# Patient Record
Sex: FEMALE | Race: ASIAN | NOT HISPANIC OR LATINO | URBAN - METROPOLITAN AREA
[De-identification: names, ages, dates, MRNs, and addresses within clinical notes are randomized per-mention and may not be internally consistent; named-entity substitution may affect disease eponyms.]

---

## 2018-01-01 ENCOUNTER — INPATIENT (INPATIENT)
Facility: HOSPITAL | Age: 58
LOS: 10 days | Discharge: HOME | End: 2018-08-06
Attending: LEGAL MEDICINE | Admitting: LEGAL MEDICINE
Payer: MEDICAID

## 2018-01-01 ENCOUNTER — INPATIENT (INPATIENT)
Facility: HOSPITAL | Age: 58
LOS: 2 days | End: 2018-09-16
Attending: LEGAL MEDICINE | Admitting: LEGAL MEDICINE
Payer: MEDICAID

## 2018-01-01 VITALS
RESPIRATION RATE: 16 BRPM | SYSTOLIC BLOOD PRESSURE: 125 MMHG | TEMPERATURE: 98 F | OXYGEN SATURATION: 99 % | DIASTOLIC BLOOD PRESSURE: 8 MMHG | HEART RATE: 81 BPM

## 2018-01-01 VITALS
TEMPERATURE: 99 F | RESPIRATION RATE: 14 BRPM | SYSTOLIC BLOOD PRESSURE: 153 MMHG | HEART RATE: 96 BPM | DIASTOLIC BLOOD PRESSURE: 100 MMHG | OXYGEN SATURATION: 100 %

## 2018-01-01 VITALS
RESPIRATION RATE: 14 BRPM | HEART RATE: 110 BPM | TEMPERATURE: 98 F | DIASTOLIC BLOOD PRESSURE: 84 MMHG | OXYGEN SATURATION: 98 % | SYSTOLIC BLOOD PRESSURE: 136 MMHG

## 2018-01-01 VITALS — DIASTOLIC BLOOD PRESSURE: 55 MMHG | SYSTOLIC BLOOD PRESSURE: 95 MMHG

## 2018-01-01 DIAGNOSIS — D64.9 ANEMIA, UNSPECIFIED: ICD-10-CM

## 2018-01-01 DIAGNOSIS — E87.1 HYPO-OSMOLALITY AND HYPONATREMIA: ICD-10-CM

## 2018-01-01 DIAGNOSIS — R16.0 HEPATOMEGALY, NOT ELSEWHERE CLASSIFIED: ICD-10-CM

## 2018-01-01 DIAGNOSIS — R10.9 UNSPECIFIED ABDOMINAL PAIN: ICD-10-CM

## 2018-01-01 DIAGNOSIS — E87.5 HYPERKALEMIA: ICD-10-CM

## 2018-01-01 DIAGNOSIS — Z29.9 ENCOUNTER FOR PROPHYLACTIC MEASURES, UNSPECIFIED: ICD-10-CM

## 2018-01-01 DIAGNOSIS — C79.9 SECONDARY MALIGNANT NEOPLASM OF UNSPECIFIED SITE: ICD-10-CM

## 2018-01-01 DIAGNOSIS — G93.40 ENCEPHALOPATHY, UNSPECIFIED: ICD-10-CM

## 2018-01-01 DIAGNOSIS — R06.00 DYSPNEA, UNSPECIFIED: ICD-10-CM

## 2018-01-01 DIAGNOSIS — Z51.5 ENCOUNTER FOR PALLIATIVE CARE: ICD-10-CM

## 2018-01-01 DIAGNOSIS — R74.0 NONSPECIFIC ELEVATION OF LEVELS OF TRANSAMINASE AND LACTIC ACID DEHYDROGENASE [LDH]: ICD-10-CM

## 2018-01-01 DIAGNOSIS — E87.2 ACIDOSIS: ICD-10-CM

## 2018-01-01 DIAGNOSIS — Z71.89 OTHER SPECIFIED COUNSELING: ICD-10-CM

## 2018-01-01 DIAGNOSIS — D72.829 ELEVATED WHITE BLOOD CELL COUNT, UNSPECIFIED: ICD-10-CM

## 2018-01-01 LAB
AFP-TM SERPL-MCNC: 3281 NG/ML — HIGH
ALBUMIN SERPL ELPH-MCNC: 2.2 G/DL — LOW (ref 3.3–5)
ALBUMIN SERPL ELPH-MCNC: 2.2 G/DL — LOW (ref 3.3–5)
ALBUMIN SERPL ELPH-MCNC: 2.3 G/DL — LOW (ref 3.3–5)
ALBUMIN SERPL ELPH-MCNC: 2.4 G/DL — LOW (ref 3.3–5)
ALBUMIN SERPL ELPH-MCNC: 3.5 G/DL — SIGNIFICANT CHANGE UP (ref 3.3–5)
ALBUMIN SERPL ELPH-MCNC: 3.6 G/DL — SIGNIFICANT CHANGE UP (ref 3.3–5)
ALBUMIN SERPL ELPH-MCNC: 3.6 G/DL — SIGNIFICANT CHANGE UP (ref 3.3–5)
ALBUMIN SERPL ELPH-MCNC: 3.7 G/DL — SIGNIFICANT CHANGE UP (ref 3.3–5)
ALBUMIN SERPL ELPH-MCNC: 3.8 G/DL — SIGNIFICANT CHANGE UP (ref 3.3–5)
ALBUMIN SERPL ELPH-MCNC: 3.9 G/DL — SIGNIFICANT CHANGE UP (ref 3.3–5)
ALBUMIN SERPL ELPH-MCNC: 4.2 G/DL — SIGNIFICANT CHANGE UP (ref 3.3–5)
ALP SERPL-CCNC: 179 U/L — HIGH (ref 40–120)
ALP SERPL-CCNC: 187 U/L — HIGH (ref 40–120)
ALP SERPL-CCNC: 198 U/L — HIGH (ref 40–120)
ALP SERPL-CCNC: 207 U/L — HIGH (ref 40–120)
ALP SERPL-CCNC: 218 U/L — HIGH (ref 40–120)
ALP SERPL-CCNC: 224 U/L — HIGH (ref 40–120)
ALP SERPL-CCNC: 275 U/L — HIGH (ref 40–120)
ALP SERPL-CCNC: 297 U/L — HIGH (ref 40–120)
ALP SERPL-CCNC: 307 U/L — HIGH (ref 40–120)
ALP SERPL-CCNC: 307 U/L — HIGH (ref 40–120)
ALP SERPL-CCNC: 694 U/L — HIGH (ref 40–120)
ALP SERPL-CCNC: 719 U/L — HIGH (ref 40–120)
ALP SERPL-CCNC: 724 U/L — HIGH (ref 40–120)
ALP SERPL-CCNC: 744 U/L — HIGH (ref 40–120)
ALT FLD-CCNC: 100 U/L — HIGH (ref 4–33)
ALT FLD-CCNC: 100 U/L — HIGH (ref 4–33)
ALT FLD-CCNC: 145 U/L — HIGH (ref 4–33)
ALT FLD-CCNC: 35 U/L — HIGH (ref 4–33)
ALT FLD-CCNC: 38 U/L — HIGH (ref 4–33)
ALT FLD-CCNC: 39 U/L — HIGH (ref 4–33)
ALT FLD-CCNC: 41 U/L — HIGH (ref 4–33)
ALT FLD-CCNC: 49 U/L — HIGH (ref 4–33)
ALT FLD-CCNC: 50 U/L — HIGH (ref 4–33)
ALT FLD-CCNC: 58 U/L — HIGH (ref 4–33)
ALT FLD-CCNC: 63 U/L — HIGH (ref 4–33)
ALT FLD-CCNC: 64 U/L — HIGH (ref 4–33)
ALT FLD-CCNC: 64 U/L — HIGH (ref 4–33)
ALT FLD-CCNC: 97 U/L — HIGH (ref 4–33)
AMMONIA BLD-MCNC: 71 UMOL/L — HIGH (ref 11–55)
AMMONIA BLD-MCNC: 81 UMOL/L — HIGH (ref 11–55)
AMMONIA BLD-MCNC: 88 UMOL/L — HIGH (ref 11–55)
AMMONIA BLD-MCNC: 93 UMOL/L — HIGH (ref 11–55)
ANISOCYTOSIS BLD QL: SIGNIFICANT CHANGE UP
ANISOCYTOSIS BLD QL: SLIGHT — SIGNIFICANT CHANGE UP
APTT BLD: 29.2 SEC — SIGNIFICANT CHANGE UP (ref 27.5–37.4)
APTT BLD: 29.9 SEC — SIGNIFICANT CHANGE UP (ref 27.5–37.4)
APTT BLD: 40.8 SEC — HIGH (ref 27.5–37.4)
AST SERPL-CCNC: 107 U/L — HIGH (ref 4–32)
AST SERPL-CCNC: 114 U/L — HIGH (ref 4–32)
AST SERPL-CCNC: 121 U/L — HIGH (ref 4–32)
AST SERPL-CCNC: 121 U/L — HIGH (ref 4–32)
AST SERPL-CCNC: 413 U/L — HIGH (ref 4–32)
AST SERPL-CCNC: 418 U/L — HIGH (ref 4–32)
AST SERPL-CCNC: 443 U/L — HIGH (ref 4–32)
AST SERPL-CCNC: 73 U/L — HIGH (ref 4–32)
AST SERPL-CCNC: 75 U/L — HIGH (ref 4–32)
AST SERPL-CCNC: 78 U/L — HIGH (ref 4–32)
AST SERPL-CCNC: 791 U/L — HIGH (ref 4–32)
AST SERPL-CCNC: 83 U/L — HIGH (ref 4–32)
AST SERPL-CCNC: 86 U/L — HIGH (ref 4–32)
AST SERPL-CCNC: 92 U/L — HIGH (ref 4–32)
BASE EXCESS BLDV CALC-SCNC: 1.4 MMOL/L — SIGNIFICANT CHANGE UP
BASO STIPL BLD QL SMEAR: PRESENT — SIGNIFICANT CHANGE UP
BASO STIPL BLD QL SMEAR: SLIGHT — SIGNIFICANT CHANGE UP
BASOPHILS # BLD AUTO: 0.02 K/UL — SIGNIFICANT CHANGE UP (ref 0–0.2)
BASOPHILS # BLD AUTO: 0.05 K/UL — SIGNIFICANT CHANGE UP (ref 0–0.2)
BASOPHILS NFR BLD AUTO: 0.3 % — SIGNIFICANT CHANGE UP (ref 0–2)
BASOPHILS NFR BLD AUTO: 0.3 % — SIGNIFICANT CHANGE UP (ref 0–2)
BASOPHILS NFR SPEC: 0 % — SIGNIFICANT CHANGE UP (ref 0–2)
BASOPHILS NFR SPEC: 1 % — SIGNIFICANT CHANGE UP (ref 0–2)
BILIRUB DIRECT SERPL-MCNC: 0.3 MG/DL — HIGH (ref 0.1–0.2)
BILIRUB DIRECT SERPL-MCNC: 0.3 MG/DL — HIGH (ref 0.1–0.2)
BILIRUB DIRECT SERPL-MCNC: 0.4 MG/DL — HIGH (ref 0.1–0.2)
BILIRUB SERPL-MCNC: 0.8 MG/DL — SIGNIFICANT CHANGE UP (ref 0.2–1.2)
BILIRUB SERPL-MCNC: 0.9 MG/DL — SIGNIFICANT CHANGE UP (ref 0.2–1.2)
BILIRUB SERPL-MCNC: 1 MG/DL — SIGNIFICANT CHANGE UP (ref 0.2–1.2)
BILIRUB SERPL-MCNC: 1.1 MG/DL — SIGNIFICANT CHANGE UP (ref 0.2–1.2)
BILIRUB SERPL-MCNC: 1.2 MG/DL — SIGNIFICANT CHANGE UP (ref 0.2–1.2)
BILIRUB SERPL-MCNC: 1.2 MG/DL — SIGNIFICANT CHANGE UP (ref 0.2–1.2)
BILIRUB SERPL-MCNC: 1.3 MG/DL — HIGH (ref 0.2–1.2)
BILIRUB SERPL-MCNC: 31.3 MG/DL — HIGH (ref 0.2–1.2)
BILIRUB SERPL-MCNC: 31.7 MG/DL — HIGH (ref 0.2–1.2)
BILIRUB SERPL-MCNC: 32 MG/DL — HIGH (ref 0.2–1.2)
BILIRUB SERPL-MCNC: 32.5 MG/DL — HIGH (ref 0.2–1.2)
BLASTS # FLD: 0 % — SIGNIFICANT CHANGE UP (ref 0–0)
BLOOD GAS VENOUS - CREATININE: 0.67 MG/DL — SIGNIFICANT CHANGE UP (ref 0.5–1.3)
BUN SERPL-MCNC: 11 MG/DL — SIGNIFICANT CHANGE UP (ref 7–23)
BUN SERPL-MCNC: 12 MG/DL — SIGNIFICANT CHANGE UP (ref 7–23)
BUN SERPL-MCNC: 12 MG/DL — SIGNIFICANT CHANGE UP (ref 7–23)
BUN SERPL-MCNC: 13 MG/DL — SIGNIFICANT CHANGE UP (ref 7–23)
BUN SERPL-MCNC: 14 MG/DL — SIGNIFICANT CHANGE UP (ref 7–23)
BUN SERPL-MCNC: 14 MG/DL — SIGNIFICANT CHANGE UP (ref 7–23)
BUN SERPL-MCNC: 44 MG/DL — HIGH (ref 7–23)
BUN SERPL-MCNC: 45 MG/DL — HIGH (ref 7–23)
BUN SERPL-MCNC: 50 MG/DL — HIGH (ref 7–23)
BUN SERPL-MCNC: 62 MG/DL — HIGH (ref 7–23)
CALCIUM SERPL-MCNC: 8.7 MG/DL — SIGNIFICANT CHANGE UP (ref 8.4–10.5)
CALCIUM SERPL-MCNC: 9.1 MG/DL — SIGNIFICANT CHANGE UP (ref 8.4–10.5)
CALCIUM SERPL-MCNC: 9.2 MG/DL — SIGNIFICANT CHANGE UP (ref 8.4–10.5)
CALCIUM SERPL-MCNC: 9.3 MG/DL — SIGNIFICANT CHANGE UP (ref 8.4–10.5)
CALCIUM SERPL-MCNC: 9.4 MG/DL — SIGNIFICANT CHANGE UP (ref 8.4–10.5)
CALCIUM SERPL-MCNC: 9.4 MG/DL — SIGNIFICANT CHANGE UP (ref 8.4–10.5)
CALCIUM SERPL-MCNC: 9.6 MG/DL — SIGNIFICANT CHANGE UP (ref 8.4–10.5)
CALCIUM SERPL-MCNC: 9.7 MG/DL — SIGNIFICANT CHANGE UP (ref 8.4–10.5)
CANCER AG19-9 SERPL-ACNC: 74 U/ML — HIGH
CEA SERPL-MCNC: 5.8 NG/ML — HIGH (ref 1–3.8)
CHLORIDE BLDV-SCNC: 104 MMOL/L — SIGNIFICANT CHANGE UP (ref 96–108)
CHLORIDE SERPL-SCNC: 101 MMOL/L — SIGNIFICANT CHANGE UP (ref 98–107)
CHLORIDE SERPL-SCNC: 104 MMOL/L — SIGNIFICANT CHANGE UP (ref 98–107)
CHLORIDE SERPL-SCNC: 82 MMOL/L — LOW (ref 98–107)
CHLORIDE SERPL-SCNC: 84 MMOL/L — LOW (ref 98–107)
CHLORIDE SERPL-SCNC: 84 MMOL/L — LOW (ref 98–107)
CHLORIDE SERPL-SCNC: 87 MMOL/L — LOW (ref 98–107)
CHLORIDE SERPL-SCNC: 98 MMOL/L — SIGNIFICANT CHANGE UP (ref 98–107)
CHLORIDE SERPL-SCNC: 98 MMOL/L — SIGNIFICANT CHANGE UP (ref 98–107)
CO2 SERPL-SCNC: 10 MMOL/L — CRITICAL LOW (ref 22–31)
CO2 SERPL-SCNC: 11 MMOL/L — LOW (ref 22–31)
CO2 SERPL-SCNC: 12 MMOL/L — LOW (ref 22–31)
CO2 SERPL-SCNC: 24 MMOL/L — SIGNIFICANT CHANGE UP (ref 22–31)
CO2 SERPL-SCNC: 25 MMOL/L — SIGNIFICANT CHANGE UP (ref 22–31)
CO2 SERPL-SCNC: 26 MMOL/L — SIGNIFICANT CHANGE UP (ref 22–31)
CO2 SERPL-SCNC: 26 MMOL/L — SIGNIFICANT CHANGE UP (ref 22–31)
CO2 SERPL-SCNC: 8 MMOL/L — CRITICAL LOW (ref 22–31)
CREAT SERPL-MCNC: 0.63 MG/DL — SIGNIFICANT CHANGE UP (ref 0.5–1.3)
CREAT SERPL-MCNC: 0.65 MG/DL — SIGNIFICANT CHANGE UP (ref 0.5–1.3)
CREAT SERPL-MCNC: 0.67 MG/DL — SIGNIFICANT CHANGE UP (ref 0.5–1.3)
CREAT SERPL-MCNC: 0.72 MG/DL — SIGNIFICANT CHANGE UP (ref 0.5–1.3)
CREAT SERPL-MCNC: 0.73 MG/DL — SIGNIFICANT CHANGE UP (ref 0.5–1.3)
CREAT SERPL-MCNC: 0.74 MG/DL — SIGNIFICANT CHANGE UP (ref 0.5–1.3)
CREAT SERPL-MCNC: 0.78 MG/DL — SIGNIFICANT CHANGE UP (ref 0.5–1.3)
CREAT SERPL-MCNC: 0.8 MG/DL — SIGNIFICANT CHANGE UP (ref 0.5–1.3)
CREAT SERPL-MCNC: 0.8 MG/DL — SIGNIFICANT CHANGE UP (ref 0.5–1.3)
CREAT SERPL-MCNC: 1.06 MG/DL — SIGNIFICANT CHANGE UP (ref 0.5–1.3)
CREAT SERPL-MCNC: 1.07 MG/DL — SIGNIFICANT CHANGE UP (ref 0.5–1.3)
CREAT SERPL-MCNC: 1.21 MG/DL — SIGNIFICANT CHANGE UP (ref 0.5–1.3)
CREAT SERPL-MCNC: 1.77 MG/DL — HIGH (ref 0.5–1.3)
DACRYOCYTES BLD QL SMEAR: SIGNIFICANT CHANGE UP
DACRYOCYTES BLD QL SMEAR: SLIGHT — SIGNIFICANT CHANGE UP
EOSINOPHIL # BLD AUTO: 0.03 K/UL — SIGNIFICANT CHANGE UP (ref 0–0.5)
EOSINOPHIL # BLD AUTO: 0.11 K/UL — SIGNIFICANT CHANGE UP (ref 0–0.5)
EOSINOPHIL NFR BLD AUTO: 0.2 % — SIGNIFICANT CHANGE UP (ref 0–6)
EOSINOPHIL NFR BLD AUTO: 1.5 % — SIGNIFICANT CHANGE UP (ref 0–6)
EOSINOPHIL NFR FLD: 1.8 % — SIGNIFICANT CHANGE UP (ref 0–6)
FERRITIN SERPL-MCNC: 531.4 NG/ML — HIGH (ref 15–150)
FOLATE SERPL-MCNC: 14 NG/ML — SIGNIFICANT CHANGE UP (ref 4.7–20)
GAS PNL BLDV: 137 MMOL/L — SIGNIFICANT CHANGE UP (ref 136–146)
GIANT PLATELETS BLD QL SMEAR: PRESENT — SIGNIFICANT CHANGE UP
GLUCOSE BLDC GLUCOMTR-MCNC: 103 MG/DL — HIGH (ref 70–99)
GLUCOSE BLDC GLUCOMTR-MCNC: 107 MG/DL — HIGH (ref 70–99)
GLUCOSE BLDC GLUCOMTR-MCNC: 110 MG/DL — HIGH (ref 70–99)
GLUCOSE BLDC GLUCOMTR-MCNC: 116 MG/DL — HIGH (ref 70–99)
GLUCOSE BLDC GLUCOMTR-MCNC: 116 MG/DL — HIGH (ref 70–99)
GLUCOSE BLDC GLUCOMTR-MCNC: 121 MG/DL — HIGH (ref 70–99)
GLUCOSE BLDC GLUCOMTR-MCNC: 83 MG/DL — SIGNIFICANT CHANGE UP (ref 70–99)
GLUCOSE BLDV-MCNC: 94 — SIGNIFICANT CHANGE UP (ref 70–99)
GLUCOSE SERPL-MCNC: 69 MG/DL — LOW (ref 70–99)
GLUCOSE SERPL-MCNC: 74 MG/DL — SIGNIFICANT CHANGE UP (ref 70–99)
GLUCOSE SERPL-MCNC: 75 MG/DL — SIGNIFICANT CHANGE UP (ref 70–99)
GLUCOSE SERPL-MCNC: 76 MG/DL — SIGNIFICANT CHANGE UP (ref 70–99)
GLUCOSE SERPL-MCNC: 78 MG/DL — SIGNIFICANT CHANGE UP (ref 70–99)
GLUCOSE SERPL-MCNC: 80 MG/DL — SIGNIFICANT CHANGE UP (ref 70–99)
GLUCOSE SERPL-MCNC: 81 MG/DL — SIGNIFICANT CHANGE UP (ref 70–99)
GLUCOSE SERPL-MCNC: 82 MG/DL — SIGNIFICANT CHANGE UP (ref 70–99)
GLUCOSE SERPL-MCNC: 83 MG/DL — SIGNIFICANT CHANGE UP (ref 70–99)
GLUCOSE SERPL-MCNC: 85 MG/DL — SIGNIFICANT CHANGE UP (ref 70–99)
GLUCOSE SERPL-MCNC: 90 MG/DL — SIGNIFICANT CHANGE UP (ref 70–99)
HAV IGM SER-ACNC: NONREACTIVE — SIGNIFICANT CHANGE UP
HBA1C BLD-MCNC: 4.9 % — SIGNIFICANT CHANGE UP (ref 4–5.6)
HBV CORE IGM SER-ACNC: NONREACTIVE — SIGNIFICANT CHANGE UP
HBV SURFACE AG SER-ACNC: NONREACTIVE — SIGNIFICANT CHANGE UP
HCO3 BLDV-SCNC: 24 MMOL/L — SIGNIFICANT CHANGE UP (ref 20–27)
HCT VFR BLD CALC: 27 % — LOW (ref 34.5–45)
HCT VFR BLD CALC: 27 % — LOW (ref 34.5–45)
HCT VFR BLD CALC: 27.1 % — LOW (ref 34.5–45)
HCT VFR BLD CALC: 27.4 % — LOW (ref 34.5–45)
HCT VFR BLD CALC: 33.5 % — LOW (ref 34.5–45)
HCT VFR BLD CALC: 33.8 % — LOW (ref 34.5–45)
HCT VFR BLD CALC: 33.9 % — LOW (ref 34.5–45)
HCT VFR BLD CALC: 34.7 % — SIGNIFICANT CHANGE UP (ref 34.5–45)
HCT VFR BLD CALC: 34.8 % — SIGNIFICANT CHANGE UP (ref 34.5–45)
HCT VFR BLD CALC: 34.8 % — SIGNIFICANT CHANGE UP (ref 34.5–45)
HCT VFR BLD CALC: 35.3 % — SIGNIFICANT CHANGE UP (ref 34.5–45)
HCT VFR BLD CALC: 36.7 % — SIGNIFICANT CHANGE UP (ref 34.5–45)
HCT VFR BLDV CALC: 33.2 % — LOW (ref 34.5–45)
HCV AB S/CO SERPL IA: 0.41 S/CO — SIGNIFICANT CHANGE UP
HCV AB SERPL-IMP: SIGNIFICANT CHANGE UP
HGB BLD-MCNC: 10.1 G/DL — LOW (ref 11.5–15.5)
HGB BLD-MCNC: 10.1 G/DL — LOW (ref 11.5–15.5)
HGB BLD-MCNC: 10.2 G/DL — LOW (ref 11.5–15.5)
HGB BLD-MCNC: 10.4 G/DL — LOW (ref 11.5–15.5)
HGB BLD-MCNC: 10.4 G/DL — LOW (ref 11.5–15.5)
HGB BLD-MCNC: 10.7 G/DL — LOW (ref 11.5–15.5)
HGB BLD-MCNC: 8.4 G/DL — LOW (ref 11.5–15.5)
HGB BLD-MCNC: 8.6 G/DL — LOW (ref 11.5–15.5)
HGB BLD-MCNC: 8.7 G/DL — LOW (ref 11.5–15.5)
HGB BLD-MCNC: 8.9 G/DL — LOW (ref 11.5–15.5)
HGB BLD-MCNC: 9.7 G/DL — LOW (ref 11.5–15.5)
HGB BLD-MCNC: 9.9 G/DL — LOW (ref 11.5–15.5)
HGB BLDV-MCNC: 10.8 G/DL — LOW (ref 11.5–15.5)
HIV 1+2 AB+HIV1 P24 AG SERPL QL IA: SIGNIFICANT CHANGE UP
HYPOCHROMIA BLD QL: SIGNIFICANT CHANGE UP
HYPOCHROMIA BLD QL: SLIGHT — SIGNIFICANT CHANGE UP
IMM GRANULOCYTES # BLD AUTO: 0.05 # — SIGNIFICANT CHANGE UP
IMM GRANULOCYTES # BLD AUTO: 1.45 # — SIGNIFICANT CHANGE UP
IMM GRANULOCYTES NFR BLD AUTO: 0.7 % — SIGNIFICANT CHANGE UP (ref 0–1.5)
IMM GRANULOCYTES NFR BLD AUTO: 8 % — HIGH (ref 0–1.5)
INR BLD: 1.04 — SIGNIFICANT CHANGE UP (ref 0.88–1.17)
INR BLD: 1.06 — SIGNIFICANT CHANGE UP (ref 0.88–1.17)
INR BLD: 1.85 — HIGH (ref 0.88–1.17)
IRON SATN MFR SERPL: 181 UG/DL — SIGNIFICANT CHANGE UP (ref 140–530)
IRON SATN MFR SERPL: 39 UG/DL — SIGNIFICANT CHANGE UP (ref 30–160)
LACTATE BLDV-MCNC: 1.3 MMOL/L — SIGNIFICANT CHANGE UP (ref 0.5–2)
LG PLATELETS BLD QL AUTO: SLIGHT — SIGNIFICANT CHANGE UP
LIDOCAIN IGE QN: 23.9 U/L — SIGNIFICANT CHANGE UP (ref 7–60)
LYMPHOCYTES # BLD AUTO: 1.48 K/UL — SIGNIFICANT CHANGE UP (ref 1–3.3)
LYMPHOCYTES # BLD AUTO: 11.4 % — LOW (ref 13–44)
LYMPHOCYTES # BLD AUTO: 2.08 K/UL — SIGNIFICANT CHANGE UP (ref 1–3.3)
LYMPHOCYTES # BLD AUTO: 20.3 % — SIGNIFICANT CHANGE UP (ref 13–44)
LYMPHOCYTES NFR SPEC AUTO: 19.3 % — SIGNIFICANT CHANGE UP (ref 13–44)
LYMPHOCYTES NFR SPEC AUTO: 5 % — LOW (ref 13–44)
MACROCYTES BLD QL: SLIGHT — SIGNIFICANT CHANGE UP
MAGNESIUM SERPL-MCNC: 2.3 MG/DL — SIGNIFICANT CHANGE UP (ref 1.6–2.6)
MAGNESIUM SERPL-MCNC: 2.3 MG/DL — SIGNIFICANT CHANGE UP (ref 1.6–2.6)
MAGNESIUM SERPL-MCNC: 2.5 MG/DL — SIGNIFICANT CHANGE UP (ref 1.6–2.6)
MAGNESIUM SERPL-MCNC: 2.9 MG/DL — HIGH (ref 1.6–2.6)
MANUAL SMEAR VERIFICATION: SIGNIFICANT CHANGE UP
MANUAL SMEAR VERIFICATION: YES — SIGNIFICANT CHANGE UP
MCHC RBC-ENTMCNC: 18.1 PG — LOW (ref 27–34)
MCHC RBC-ENTMCNC: 18.1 PG — LOW (ref 27–34)
MCHC RBC-ENTMCNC: 18.2 PG — LOW (ref 27–34)
MCHC RBC-ENTMCNC: 18.3 PG — LOW (ref 27–34)
MCHC RBC-ENTMCNC: 18.4 PG — LOW (ref 27–34)
MCHC RBC-ENTMCNC: 18.4 PG — LOW (ref 27–34)
MCHC RBC-ENTMCNC: 18.5 PG — LOW (ref 27–34)
MCHC RBC-ENTMCNC: 18.8 PG — LOW (ref 27–34)
MCHC RBC-ENTMCNC: 19 PG — LOW (ref 27–34)
MCHC RBC-ENTMCNC: 28.6 % — LOW (ref 32–36)
MCHC RBC-ENTMCNC: 29 % — LOW (ref 32–36)
MCHC RBC-ENTMCNC: 29.2 % — LOW (ref 32–36)
MCHC RBC-ENTMCNC: 29.3 % — LOW (ref 32–36)
MCHC RBC-ENTMCNC: 29.4 % — LOW (ref 32–36)
MCHC RBC-ENTMCNC: 29.5 % — LOW (ref 32–36)
MCHC RBC-ENTMCNC: 29.9 % — LOW (ref 32–36)
MCHC RBC-ENTMCNC: 30.1 % — LOW (ref 32–36)
MCHC RBC-ENTMCNC: 31 % — LOW (ref 32–36)
MCHC RBC-ENTMCNC: 31.9 % — LOW (ref 32–36)
MCHC RBC-ENTMCNC: 32.2 % — SIGNIFICANT CHANGE UP (ref 32–36)
MCHC RBC-ENTMCNC: 32.5 % — SIGNIFICANT CHANGE UP (ref 32–36)
MCV RBC AUTO: 56.5 FL — LOW (ref 80–100)
MCV RBC AUTO: 57.2 FL — LOW (ref 80–100)
MCV RBC AUTO: 57.8 FL — LOW (ref 80–100)
MCV RBC AUTO: 58.8 FL — LOW (ref 80–100)
MCV RBC AUTO: 62 FL — LOW (ref 80–100)
MCV RBC AUTO: 62.1 FL — LOW (ref 80–100)
MCV RBC AUTO: 62.3 FL — LOW (ref 80–100)
MCV RBC AUTO: 62.5 FL — LOW (ref 80–100)
MCV RBC AUTO: 62.7 FL — LOW (ref 80–100)
MCV RBC AUTO: 62.9 FL — LOW (ref 80–100)
MCV RBC AUTO: 63.1 FL — LOW (ref 80–100)
MCV RBC AUTO: 63.4 FL — LOW (ref 80–100)
METAMYELOCYTES # FLD: 0 % — SIGNIFICANT CHANGE UP (ref 0–1)
MICROCYTES BLD QL: SIGNIFICANT CHANGE UP
MICROCYTES BLD QL: SLIGHT — SIGNIFICANT CHANGE UP
MONOCYTES # BLD AUTO: 0.56 K/UL — SIGNIFICANT CHANGE UP (ref 0–0.9)
MONOCYTES # BLD AUTO: 1.1 K/UL — HIGH (ref 0–0.9)
MONOCYTES NFR BLD AUTO: 6 % — SIGNIFICANT CHANGE UP (ref 2–14)
MONOCYTES NFR BLD AUTO: 7.7 % — SIGNIFICANT CHANGE UP (ref 2–14)
MONOCYTES NFR BLD: 2.6 % — SIGNIFICANT CHANGE UP (ref 2–9)
MONOCYTES NFR BLD: 5 % — SIGNIFICANT CHANGE UP (ref 2–9)
MYELOCYTES NFR BLD: 0 % — SIGNIFICANT CHANGE UP (ref 0–0)
MYELOCYTES NFR BLD: 1 % — HIGH (ref 0–0)
NEUTROPHIL AB SER-ACNC: 72.8 % — SIGNIFICANT CHANGE UP (ref 43–77)
NEUTROPHIL AB SER-ACNC: 88 % — HIGH (ref 43–77)
NEUTROPHILS # BLD AUTO: 13.49 K/UL — HIGH (ref 1.8–7.4)
NEUTROPHILS # BLD AUTO: 5.08 K/UL — SIGNIFICANT CHANGE UP (ref 1.8–7.4)
NEUTROPHILS NFR BLD AUTO: 69.5 % — SIGNIFICANT CHANGE UP (ref 43–77)
NEUTROPHILS NFR BLD AUTO: 74.1 % — SIGNIFICANT CHANGE UP (ref 43–77)
NEUTS BAND # BLD: 0 % — SIGNIFICANT CHANGE UP (ref 0–6)
NON-GYNECOLOGICAL CYTOLOGY STUDY: SIGNIFICANT CHANGE UP
NRBC # FLD: 0 — SIGNIFICANT CHANGE UP
NRBC # FLD: 0.02 — SIGNIFICANT CHANGE UP
NRBC # FLD: 0.02 — SIGNIFICANT CHANGE UP
NRBC # FLD: 4.94 — SIGNIFICANT CHANGE UP
NRBC # FLD: 5.08 — SIGNIFICANT CHANGE UP
NRBC # FLD: 5.18 — SIGNIFICANT CHANGE UP
NRBC # FLD: 9.78 — SIGNIFICANT CHANGE UP
NRBC FLD-RTO: 26.8 — SIGNIFICANT CHANGE UP
NRBC FLD-RTO: 28.5 — SIGNIFICANT CHANGE UP
NRBC FLD-RTO: 29.5 — SIGNIFICANT CHANGE UP
NRBC FLD-RTO: 52.6 — SIGNIFICANT CHANGE UP
OTHER - HEMATOLOGY %: 0 — SIGNIFICANT CHANGE UP
OVALOCYTES BLD QL SMEAR: SIGNIFICANT CHANGE UP
OVALOCYTES BLD QL SMEAR: SIGNIFICANT CHANGE UP
PCO2 BLDV: 49 MMHG — SIGNIFICANT CHANGE UP (ref 41–51)
PH BLDV: 7.35 PH — SIGNIFICANT CHANGE UP (ref 7.32–7.43)
PHOSPHATE SERPL-MCNC: 3.6 MG/DL — SIGNIFICANT CHANGE UP (ref 2.5–4.5)
PHOSPHATE SERPL-MCNC: 3.7 MG/DL — SIGNIFICANT CHANGE UP (ref 2.5–4.5)
PHOSPHATE SERPL-MCNC: 3.8 MG/DL — SIGNIFICANT CHANGE UP (ref 2.5–4.5)
PHOSPHATE SERPL-MCNC: 6.5 MG/DL — HIGH (ref 2.5–4.5)
PLATELET # BLD AUTO: 166 K/UL — SIGNIFICANT CHANGE UP (ref 150–400)
PLATELET # BLD AUTO: 168 K/UL — SIGNIFICANT CHANGE UP (ref 150–400)
PLATELET # BLD AUTO: 188 K/UL — SIGNIFICANT CHANGE UP (ref 150–400)
PLATELET # BLD AUTO: 194 K/UL — SIGNIFICANT CHANGE UP (ref 150–400)
PLATELET # BLD AUTO: 194 K/UL — SIGNIFICANT CHANGE UP (ref 150–400)
PLATELET # BLD AUTO: 200 K/UL — SIGNIFICANT CHANGE UP (ref 150–400)
PLATELET # BLD AUTO: 205 K/UL — SIGNIFICANT CHANGE UP (ref 150–400)
PLATELET # BLD AUTO: 215 K/UL — SIGNIFICANT CHANGE UP (ref 150–400)
PLATELET # BLD AUTO: 265 K/UL — SIGNIFICANT CHANGE UP (ref 150–400)
PLATELET # BLD AUTO: 274 K/UL — SIGNIFICANT CHANGE UP (ref 150–400)
PLATELET # BLD AUTO: 285 K/UL — SIGNIFICANT CHANGE UP (ref 150–400)
PLATELET # BLD AUTO: 285 K/UL — SIGNIFICANT CHANGE UP (ref 150–400)
PLATELET COUNT - ESTIMATE: ADEQUATE — SIGNIFICANT CHANGE UP
PLATELET COUNT - ESTIMATE: NORMAL — SIGNIFICANT CHANGE UP
PMV BLD: SIGNIFICANT CHANGE UP FL (ref 7–13)
PO2 BLDV: 30 MMHG — LOW (ref 35–40)
POIKILOCYTOSIS BLD QL AUTO: SIGNIFICANT CHANGE UP
POIKILOCYTOSIS BLD QL AUTO: SLIGHT — SIGNIFICANT CHANGE UP
POLYCHROMASIA BLD QL SMEAR: SLIGHT — SIGNIFICANT CHANGE UP
POTASSIUM BLDV-SCNC: 4 MMOL/L — SIGNIFICANT CHANGE UP (ref 3.4–4.5)
POTASSIUM SERPL-MCNC: 4 MMOL/L — SIGNIFICANT CHANGE UP (ref 3.5–5.3)
POTASSIUM SERPL-MCNC: 4.1 MMOL/L — SIGNIFICANT CHANGE UP (ref 3.5–5.3)
POTASSIUM SERPL-MCNC: 4.3 MMOL/L — SIGNIFICANT CHANGE UP (ref 3.5–5.3)
POTASSIUM SERPL-MCNC: 4.5 MMOL/L — SIGNIFICANT CHANGE UP (ref 3.5–5.3)
POTASSIUM SERPL-MCNC: 4.7 MMOL/L — SIGNIFICANT CHANGE UP (ref 3.5–5.3)
POTASSIUM SERPL-MCNC: 4.7 MMOL/L — SIGNIFICANT CHANGE UP (ref 3.5–5.3)
POTASSIUM SERPL-MCNC: 5.3 MMOL/L — SIGNIFICANT CHANGE UP (ref 3.5–5.3)
POTASSIUM SERPL-MCNC: 5.6 MMOL/L — HIGH (ref 3.5–5.3)
POTASSIUM SERPL-MCNC: 5.6 MMOL/L — HIGH (ref 3.5–5.3)
POTASSIUM SERPL-MCNC: 6.1 MMOL/L — HIGH (ref 3.5–5.3)
POTASSIUM SERPL-SCNC: 4 MMOL/L — SIGNIFICANT CHANGE UP (ref 3.5–5.3)
POTASSIUM SERPL-SCNC: 4.1 MMOL/L — SIGNIFICANT CHANGE UP (ref 3.5–5.3)
POTASSIUM SERPL-SCNC: 4.3 MMOL/L — SIGNIFICANT CHANGE UP (ref 3.5–5.3)
POTASSIUM SERPL-SCNC: 4.5 MMOL/L — SIGNIFICANT CHANGE UP (ref 3.5–5.3)
POTASSIUM SERPL-SCNC: 4.7 MMOL/L — SIGNIFICANT CHANGE UP (ref 3.5–5.3)
POTASSIUM SERPL-SCNC: 4.7 MMOL/L — SIGNIFICANT CHANGE UP (ref 3.5–5.3)
POTASSIUM SERPL-SCNC: 5.3 MMOL/L — SIGNIFICANT CHANGE UP (ref 3.5–5.3)
POTASSIUM SERPL-SCNC: 5.6 MMOL/L — HIGH (ref 3.5–5.3)
POTASSIUM SERPL-SCNC: 5.6 MMOL/L — HIGH (ref 3.5–5.3)
POTASSIUM SERPL-SCNC: 6.1 MMOL/L — HIGH (ref 3.5–5.3)
PROMYELOCYTES # FLD: 0 % — SIGNIFICANT CHANGE UP (ref 0–0)
PROT SERPL-MCNC: 4.4 G/DL — LOW (ref 6–8.3)
PROT SERPL-MCNC: 4.6 G/DL — LOW (ref 6–8.3)
PROT SERPL-MCNC: 4.6 G/DL — LOW (ref 6–8.3)
PROT SERPL-MCNC: 4.7 G/DL — LOW (ref 6–8.3)
PROT SERPL-MCNC: 6.7 G/DL — SIGNIFICANT CHANGE UP (ref 6–8.3)
PROT SERPL-MCNC: 6.9 G/DL — SIGNIFICANT CHANGE UP (ref 6–8.3)
PROT SERPL-MCNC: 6.9 G/DL — SIGNIFICANT CHANGE UP (ref 6–8.3)
PROT SERPL-MCNC: 7 G/DL — SIGNIFICANT CHANGE UP (ref 6–8.3)
PROT SERPL-MCNC: 7.1 G/DL — SIGNIFICANT CHANGE UP (ref 6–8.3)
PROT SERPL-MCNC: 7.2 G/DL — SIGNIFICANT CHANGE UP (ref 6–8.3)
PROT SERPL-MCNC: 7.3 G/DL — SIGNIFICANT CHANGE UP (ref 6–8.3)
PROT SERPL-MCNC: 7.7 G/DL — SIGNIFICANT CHANGE UP (ref 6–8.3)
PROTHROM AB SERPL-ACNC: 11.6 SEC — SIGNIFICANT CHANGE UP (ref 9.8–13.1)
PROTHROM AB SERPL-ACNC: 12.2 SEC — SIGNIFICANT CHANGE UP (ref 9.8–13.1)
PROTHROM AB SERPL-ACNC: 21.5 SEC — HIGH (ref 9.8–13.1)
RBC # BLD: 4.61 M/UL — SIGNIFICANT CHANGE UP (ref 3.8–5.2)
RBC # BLD: 4.72 M/UL — SIGNIFICANT CHANGE UP (ref 3.8–5.2)
RBC # BLD: 4.74 M/UL — SIGNIFICANT CHANGE UP (ref 3.8–5.2)
RBC # BLD: 4.78 M/UL — SIGNIFICANT CHANGE UP (ref 3.8–5.2)
RBC # BLD: 5.31 M/UL — HIGH (ref 3.8–5.2)
RBC # BLD: 5.35 M/UL — HIGH (ref 3.8–5.2)
RBC # BLD: 5.37 M/UL — HIGH (ref 3.8–5.2)
RBC # BLD: 5.55 M/UL — HIGH (ref 3.8–5.2)
RBC # BLD: 5.57 M/UL — HIGH (ref 3.8–5.2)
RBC # BLD: 5.61 M/UL — HIGH (ref 3.8–5.2)
RBC # BLD: 5.65 M/UL — HIGH (ref 3.8–5.2)
RBC # BLD: 5.91 M/UL — HIGH (ref 3.8–5.2)
RBC # FLD: 15.7 % — HIGH (ref 10.3–14.5)
RBC # FLD: 15.8 % — HIGH (ref 10.3–14.5)
RBC # FLD: 15.9 % — HIGH (ref 10.3–14.5)
RBC # FLD: 16 % — HIGH (ref 10.3–14.5)
RBC # FLD: 16 % — HIGH (ref 10.3–14.5)
RBC # FLD: 16.2 % — HIGH (ref 10.3–14.5)
RBC # FLD: 28.2 % — HIGH (ref 10.3–14.5)
RBC # FLD: 28.2 % — HIGH (ref 10.3–14.5)
RBC # FLD: 29 % — HIGH (ref 10.3–14.5)
RBC # FLD: 29.4 % — HIGH (ref 10.3–14.5)
RETICS #: 119 K/UL — SIGNIFICANT CHANGE UP (ref 25–125)
RETICS/RBC NFR: 2.1 % — SIGNIFICANT CHANGE UP (ref 0.5–2.5)
SAO2 % BLDV: 46.7 % — LOW (ref 60–85)
SCHISTOCYTES BLD QL AUTO: SLIGHT — SIGNIFICANT CHANGE UP
SCHISTOCYTES BLD QL AUTO: SLIGHT — SIGNIFICANT CHANGE UP
SODIUM SERPL-SCNC: 124 MMOL/L — LOW (ref 135–145)
SODIUM SERPL-SCNC: 125 MMOL/L — LOW (ref 135–145)
SODIUM SERPL-SCNC: 126 MMOL/L — LOW (ref 135–145)
SODIUM SERPL-SCNC: 128 MMOL/L — LOW (ref 135–145)
SODIUM SERPL-SCNC: 137 MMOL/L — SIGNIFICANT CHANGE UP (ref 135–145)
SODIUM SERPL-SCNC: 138 MMOL/L — SIGNIFICANT CHANGE UP (ref 135–145)
SODIUM SERPL-SCNC: 138 MMOL/L — SIGNIFICANT CHANGE UP (ref 135–145)
SODIUM SERPL-SCNC: 139 MMOL/L — SIGNIFICANT CHANGE UP (ref 135–145)
SODIUM SERPL-SCNC: 142 MMOL/L — SIGNIFICANT CHANGE UP (ref 135–145)
SPECIMEN SOURCE: SIGNIFICANT CHANGE UP
SPECIMEN SOURCE: SIGNIFICANT CHANGE UP
TARGETS BLD QL SMEAR: SIGNIFICANT CHANGE UP
TARGETS BLD QL SMEAR: SLIGHT — SIGNIFICANT CHANGE UP
UIBC SERPL-MCNC: 142.2 UG/DL — SIGNIFICANT CHANGE UP (ref 110–370)
VANCOMYCIN FLD-MCNC: 17.5 UG/ML — SIGNIFICANT CHANGE UP
VANCOMYCIN FLD-MCNC: 21.4 UG/ML — SIGNIFICANT CHANGE UP
VARIANT LYMPHS # BLD: 3.5 % — SIGNIFICANT CHANGE UP
VIT B12 SERPL-MCNC: 322 PG/ML — SIGNIFICANT CHANGE UP (ref 200–900)
WBC # BLD: 17.2 K/UL — HIGH (ref 3.8–10.5)
WBC # BLD: 18.2 K/UL — HIGH (ref 3.8–10.5)
WBC # BLD: 18.43 K/UL — HIGH (ref 3.8–10.5)
WBC # BLD: 18.58 K/UL — HIGH (ref 3.8–10.5)
WBC # BLD: 5.94 K/UL — SIGNIFICANT CHANGE UP (ref 3.8–10.5)
WBC # BLD: 6.72 K/UL — SIGNIFICANT CHANGE UP (ref 3.8–10.5)
WBC # BLD: 6.82 K/UL — SIGNIFICANT CHANGE UP (ref 3.8–10.5)
WBC # BLD: 6.95 K/UL — SIGNIFICANT CHANGE UP (ref 3.8–10.5)
WBC # BLD: 7.3 K/UL — SIGNIFICANT CHANGE UP (ref 3.8–10.5)
WBC # BLD: 7.33 K/UL — SIGNIFICANT CHANGE UP (ref 3.8–10.5)
WBC # BLD: 7.56 K/UL — SIGNIFICANT CHANGE UP (ref 3.8–10.5)
WBC # BLD: 7.71 K/UL — SIGNIFICANT CHANGE UP (ref 3.8–10.5)
WBC # FLD AUTO: 17.2 K/UL — HIGH (ref 3.8–10.5)
WBC # FLD AUTO: 18.2 K/UL — HIGH (ref 3.8–10.5)
WBC # FLD AUTO: 18.43 K/UL — HIGH (ref 3.8–10.5)
WBC # FLD AUTO: 18.58 K/UL — HIGH (ref 3.8–10.5)
WBC # FLD AUTO: 5.94 K/UL — SIGNIFICANT CHANGE UP (ref 3.8–10.5)
WBC # FLD AUTO: 6.72 K/UL — SIGNIFICANT CHANGE UP (ref 3.8–10.5)
WBC # FLD AUTO: 6.82 K/UL — SIGNIFICANT CHANGE UP (ref 3.8–10.5)
WBC # FLD AUTO: 6.95 K/UL — SIGNIFICANT CHANGE UP (ref 3.8–10.5)
WBC # FLD AUTO: 7.3 K/UL — SIGNIFICANT CHANGE UP (ref 3.8–10.5)
WBC # FLD AUTO: 7.33 K/UL — SIGNIFICANT CHANGE UP (ref 3.8–10.5)
WBC # FLD AUTO: 7.56 K/UL — SIGNIFICANT CHANGE UP (ref 3.8–10.5)
WBC # FLD AUTO: 7.71 K/UL — SIGNIFICANT CHANGE UP (ref 3.8–10.5)

## 2018-01-01 PROCEDURE — 88341 IMHCHEM/IMCYTCHM EA ADD ANTB: CPT | Mod: 26

## 2018-01-01 PROCEDURE — 99232 SBSQ HOSP IP/OBS MODERATE 35: CPT | Mod: GC

## 2018-01-01 PROCEDURE — 93010 ELECTROCARDIOGRAM REPORT: CPT

## 2018-01-01 PROCEDURE — 76705 ECHO EXAM OF ABDOMEN: CPT | Mod: 26

## 2018-01-01 PROCEDURE — 99223 1ST HOSP IP/OBS HIGH 75: CPT | Mod: GC

## 2018-01-01 PROCEDURE — 88307 TISSUE EXAM BY PATHOLOGIST: CPT | Mod: 26

## 2018-01-01 PROCEDURE — 99223 1ST HOSP IP/OBS HIGH 75: CPT

## 2018-01-01 PROCEDURE — 47000 NEEDLE BIOPSY OF LIVER PERQ: CPT

## 2018-01-01 PROCEDURE — 88342 IMHCHEM/IMCYTCHM 1ST ANTB: CPT | Mod: 26

## 2018-01-01 PROCEDURE — 99222 1ST HOSP IP/OBS MODERATE 55: CPT | Mod: GC

## 2018-01-01 PROCEDURE — 74182 MRI ABDOMEN W/CONTRAST: CPT | Mod: 26

## 2018-01-01 PROCEDURE — 76942 ECHO GUIDE FOR BIOPSY: CPT | Mod: 26

## 2018-01-01 PROCEDURE — 71260 CT THORAX DX C+: CPT | Mod: 26

## 2018-01-01 PROCEDURE — 99497 ADVNCD CARE PLAN 30 MIN: CPT | Mod: 25

## 2018-01-01 PROCEDURE — 99233 SBSQ HOSP IP/OBS HIGH 50: CPT | Mod: GC

## 2018-01-01 RX ORDER — SODIUM CHLORIDE 9 MG/ML
250 INJECTION INTRAMUSCULAR; INTRAVENOUS; SUBCUTANEOUS ONCE
Qty: 0 | Refills: 0 | Status: COMPLETED | OUTPATIENT
Start: 2018-01-01 | End: 2018-01-01

## 2018-01-01 RX ORDER — SODIUM CHLORIDE 9 MG/ML
1000 INJECTION INTRAMUSCULAR; INTRAVENOUS; SUBCUTANEOUS
Qty: 0 | Refills: 0 | Status: DISCONTINUED | OUTPATIENT
Start: 2018-01-01 | End: 2018-01-01

## 2018-01-01 RX ORDER — MORPHINE SULFATE 50 MG/1
4 CAPSULE, EXTENDED RELEASE ORAL ONCE
Qty: 0 | Refills: 0 | Status: DISCONTINUED | OUTPATIENT
Start: 2018-01-01 | End: 2018-01-01

## 2018-01-01 RX ORDER — LACTULOSE 10 G/15ML
200 SOLUTION ORAL ONCE
Qty: 0 | Refills: 0 | Status: COMPLETED | OUTPATIENT
Start: 2018-01-01 | End: 2018-01-01

## 2018-01-01 RX ORDER — SODIUM BICARBONATE 1 MEQ/ML
0.09 SYRINGE (ML) INTRAVENOUS
Qty: 75 | Refills: 0 | Status: DISCONTINUED | OUTPATIENT
Start: 2018-01-01 | End: 2018-01-01

## 2018-01-01 RX ORDER — SODIUM CHLORIDE 9 MG/ML
1000 INJECTION INTRAMUSCULAR; INTRAVENOUS; SUBCUTANEOUS ONCE
Qty: 0 | Refills: 0 | Status: COMPLETED | OUTPATIENT
Start: 2018-01-01 | End: 2018-01-01

## 2018-01-01 RX ORDER — HYDROMORPHONE HYDROCHLORIDE 2 MG/ML
0.2 INJECTION INTRAMUSCULAR; INTRAVENOUS; SUBCUTANEOUS
Qty: 0 | Refills: 0 | Status: DISCONTINUED | OUTPATIENT
Start: 2018-01-01 | End: 2018-01-01

## 2018-01-01 RX ORDER — PIPERACILLIN AND TAZOBACTAM 4; .5 G/20ML; G/20ML
3.38 INJECTION, POWDER, LYOPHILIZED, FOR SOLUTION INTRAVENOUS EVERY 8 HOURS
Qty: 0 | Refills: 0 | Status: DISCONTINUED | OUTPATIENT
Start: 2018-01-01 | End: 2018-01-01

## 2018-01-01 RX ORDER — SODIUM POLYSTYRENE SULFONATE 4.1 MEQ/G
15 POWDER, FOR SUSPENSION ORAL ONCE
Qty: 0 | Refills: 0 | Status: COMPLETED | OUTPATIENT
Start: 2018-01-01 | End: 2018-01-01

## 2018-01-01 RX ORDER — INSULIN HUMAN 100 [IU]/ML
10 INJECTION, SOLUTION SUBCUTANEOUS ONCE
Qty: 0 | Refills: 0 | Status: COMPLETED | OUTPATIENT
Start: 2018-01-01 | End: 2018-01-01

## 2018-01-01 RX ORDER — DEXTROSE 50 % IN WATER 50 %
50 SYRINGE (ML) INTRAVENOUS ONCE
Qty: 0 | Refills: 0 | Status: COMPLETED | OUTPATIENT
Start: 2018-01-01 | End: 2018-01-01

## 2018-01-01 RX ORDER — HYDROMORPHONE HYDROCHLORIDE 2 MG/ML
0.2 INJECTION INTRAMUSCULAR; INTRAVENOUS; SUBCUTANEOUS EVERY 8 HOURS
Qty: 0 | Refills: 0 | Status: DISCONTINUED | OUTPATIENT
Start: 2018-01-01 | End: 2018-01-01

## 2018-01-01 RX ORDER — LACTULOSE 10 G/15ML
20 SOLUTION ORAL ONCE
Qty: 0 | Refills: 0 | Status: DISCONTINUED | OUTPATIENT
Start: 2018-01-01 | End: 2018-01-01

## 2018-01-01 RX ORDER — PIPERACILLIN AND TAZOBACTAM 4; .5 G/20ML; G/20ML
3.38 INJECTION, POWDER, LYOPHILIZED, FOR SOLUTION INTRAVENOUS ONCE
Qty: 0 | Refills: 0 | Status: COMPLETED | OUTPATIENT
Start: 2018-01-01 | End: 2018-01-01

## 2018-01-01 RX ORDER — FAMOTIDINE 10 MG/ML
20 INJECTION INTRAVENOUS ONCE
Qty: 0 | Refills: 0 | Status: COMPLETED | OUTPATIENT
Start: 2018-01-01 | End: 2018-01-01

## 2018-01-01 RX ORDER — ONDANSETRON 8 MG/1
4 TABLET, FILM COATED ORAL EVERY 6 HOURS
Qty: 0 | Refills: 0 | Status: DISCONTINUED | OUTPATIENT
Start: 2018-01-01 | End: 2018-01-01

## 2018-01-01 RX ORDER — LACTULOSE 10 G/15ML
200 SOLUTION ORAL EVERY 6 HOURS
Qty: 0 | Refills: 0 | Status: DISCONTINUED | OUTPATIENT
Start: 2018-01-01 | End: 2018-01-01

## 2018-01-01 RX ORDER — OXYCODONE AND ACETAMINOPHEN 5; 325 MG/1; MG/1
1 TABLET ORAL ONCE
Qty: 0 | Refills: 0 | Status: DISCONTINUED | OUTPATIENT
Start: 2018-01-01 | End: 2018-01-01

## 2018-01-01 RX ORDER — IBUPROFEN 200 MG
400 TABLET ORAL ONCE
Qty: 0 | Refills: 0 | Status: COMPLETED | OUTPATIENT
Start: 2018-01-01 | End: 2018-01-01

## 2018-01-01 RX ORDER — DEXTROSE 50 % IN WATER 50 %
12.5 SYRINGE (ML) INTRAVENOUS ONCE
Qty: 0 | Refills: 0 | Status: COMPLETED | OUTPATIENT
Start: 2018-01-01 | End: 2018-01-01

## 2018-01-01 RX ORDER — VANCOMYCIN HCL 1 G
750 VIAL (EA) INTRAVENOUS EVERY 12 HOURS
Qty: 0 | Refills: 0 | Status: DISCONTINUED | OUTPATIENT
Start: 2018-01-01 | End: 2018-01-01

## 2018-01-01 RX ORDER — ENOXAPARIN SODIUM 100 MG/ML
40 INJECTION SUBCUTANEOUS EVERY 24 HOURS
Qty: 0 | Refills: 0 | Status: DISCONTINUED | OUTPATIENT
Start: 2018-01-01 | End: 2018-01-01

## 2018-01-01 RX ORDER — CALCIUM GLUCONATE 100 MG/ML
1 VIAL (ML) INTRAVENOUS ONCE
Qty: 0 | Refills: 0 | Status: COMPLETED | OUTPATIENT
Start: 2018-01-01 | End: 2018-01-01

## 2018-01-01 RX ORDER — MORPHINE SULFATE 50 MG/1
1 CAPSULE, EXTENDED RELEASE ORAL
Qty: 0 | Refills: 0 | Status: DISCONTINUED | OUTPATIENT
Start: 2018-01-01 | End: 2018-01-01

## 2018-01-01 RX ORDER — HYDROMORPHONE HYDROCHLORIDE 2 MG/ML
0.2 INJECTION INTRAMUSCULAR; INTRAVENOUS; SUBCUTANEOUS ONCE
Qty: 0 | Refills: 0 | Status: DISCONTINUED | OUTPATIENT
Start: 2018-01-01 | End: 2018-01-01

## 2018-01-01 RX ORDER — PIPERACILLIN AND TAZOBACTAM 4; .5 G/20ML; G/20ML
3.38 INJECTION, POWDER, LYOPHILIZED, FOR SOLUTION INTRAVENOUS EVERY 12 HOURS
Qty: 0 | Refills: 0 | Status: DISCONTINUED | OUTPATIENT
Start: 2018-01-01 | End: 2018-01-01

## 2018-01-01 RX ORDER — TRAMADOL HYDROCHLORIDE 50 MG/1
0 TABLET ORAL
Qty: 0 | Refills: 0 | COMMUNITY

## 2018-01-01 RX ADMIN — ENOXAPARIN SODIUM 40 MILLIGRAM(S): 100 INJECTION SUBCUTANEOUS at 17:13

## 2018-01-01 RX ADMIN — PIPERACILLIN AND TAZOBACTAM 25 GRAM(S): 4; .5 INJECTION, POWDER, LYOPHILIZED, FOR SOLUTION INTRAVENOUS at 07:41

## 2018-01-01 RX ADMIN — SODIUM POLYSTYRENE SULFONATE 15 GRAM(S): 4.1 POWDER, FOR SUSPENSION ORAL at 01:57

## 2018-01-01 RX ADMIN — HYDROMORPHONE HYDROCHLORIDE 0.2 MILLIGRAM(S): 2 INJECTION INTRAMUSCULAR; INTRAVENOUS; SUBCUTANEOUS at 21:35

## 2018-01-01 RX ADMIN — LACTULOSE 200 GRAM(S): 10 SOLUTION ORAL at 11:42

## 2018-01-01 RX ADMIN — HYDROMORPHONE HYDROCHLORIDE 0.2 MILLIGRAM(S): 2 INJECTION INTRAMUSCULAR; INTRAVENOUS; SUBCUTANEOUS at 14:14

## 2018-01-01 RX ADMIN — HYDROMORPHONE HYDROCHLORIDE 0.2 MILLIGRAM(S): 2 INJECTION INTRAMUSCULAR; INTRAVENOUS; SUBCUTANEOUS at 15:05

## 2018-01-01 RX ADMIN — PIPERACILLIN AND TAZOBACTAM 200 GRAM(S): 4; .5 INJECTION, POWDER, LYOPHILIZED, FOR SOLUTION INTRAVENOUS at 18:49

## 2018-01-01 RX ADMIN — LACTULOSE 200 GRAM(S): 10 SOLUTION ORAL at 05:51

## 2018-01-01 RX ADMIN — ENOXAPARIN SODIUM 40 MILLIGRAM(S): 100 INJECTION SUBCUTANEOUS at 18:16

## 2018-01-01 RX ADMIN — LACTULOSE 200 GRAM(S): 10 SOLUTION ORAL at 01:57

## 2018-01-01 RX ADMIN — HYDROMORPHONE HYDROCHLORIDE 0.2 MILLIGRAM(S): 2 INJECTION INTRAMUSCULAR; INTRAVENOUS; SUBCUTANEOUS at 01:03

## 2018-01-01 RX ADMIN — ENOXAPARIN SODIUM 40 MILLIGRAM(S): 100 INJECTION SUBCUTANEOUS at 17:31

## 2018-01-01 RX ADMIN — HYDROMORPHONE HYDROCHLORIDE 0.2 MILLIGRAM(S): 2 INJECTION INTRAMUSCULAR; INTRAVENOUS; SUBCUTANEOUS at 14:29

## 2018-01-01 RX ADMIN — MORPHINE SULFATE 4 MILLIGRAM(S): 50 CAPSULE, EXTENDED RELEASE ORAL at 21:07

## 2018-01-01 RX ADMIN — ENOXAPARIN SODIUM 40 MILLIGRAM(S): 100 INJECTION SUBCUTANEOUS at 18:08

## 2018-01-01 RX ADMIN — ENOXAPARIN SODIUM 40 MILLIGRAM(S): 100 INJECTION SUBCUTANEOUS at 17:33

## 2018-01-01 RX ADMIN — Medication 50 MILLILITER(S): at 08:52

## 2018-01-01 RX ADMIN — MORPHINE SULFATE 1 MILLIGRAM(S): 50 CAPSULE, EXTENDED RELEASE ORAL at 11:00

## 2018-01-01 RX ADMIN — HYDROMORPHONE HYDROCHLORIDE 0.2 MILLIGRAM(S): 2 INJECTION INTRAMUSCULAR; INTRAVENOUS; SUBCUTANEOUS at 21:05

## 2018-01-01 RX ADMIN — Medication 250 MILLIGRAM(S): at 20:31

## 2018-01-01 RX ADMIN — Medication 400 MILLIGRAM(S): at 22:45

## 2018-01-01 RX ADMIN — ENOXAPARIN SODIUM 40 MILLIGRAM(S): 100 INJECTION SUBCUTANEOUS at 17:56

## 2018-01-01 RX ADMIN — ONDANSETRON 4 MILLIGRAM(S): 8 TABLET, FILM COATED ORAL at 21:56

## 2018-01-01 RX ADMIN — Medication 12.5 MILLILITER(S): at 01:56

## 2018-01-01 RX ADMIN — PIPERACILLIN AND TAZOBACTAM 25 GRAM(S): 4; .5 INJECTION, POWDER, LYOPHILIZED, FOR SOLUTION INTRAVENOUS at 23:59

## 2018-01-01 RX ADMIN — SODIUM CHLORIDE 1000 MILLILITER(S): 9 INJECTION INTRAMUSCULAR; INTRAVENOUS; SUBCUTANEOUS at 21:07

## 2018-01-01 RX ADMIN — FAMOTIDINE 104 MILLIGRAM(S): 10 INJECTION INTRAVENOUS at 21:07

## 2018-01-01 RX ADMIN — Medication 200 GRAM(S): at 08:52

## 2018-01-01 RX ADMIN — OXYCODONE AND ACETAMINOPHEN 1 TABLET(S): 5; 325 TABLET ORAL at 21:04

## 2018-01-01 RX ADMIN — Medication 400 MILLIGRAM(S): at 05:40

## 2018-01-01 RX ADMIN — PIPERACILLIN AND TAZOBACTAM 25 GRAM(S): 4; .5 INJECTION, POWDER, LYOPHILIZED, FOR SOLUTION INTRAVENOUS at 05:30

## 2018-01-01 RX ADMIN — Medication 250 MILLIGRAM(S): at 07:41

## 2018-01-01 RX ADMIN — Medication 75 MEQ/KG/HR: at 18:50

## 2018-01-01 RX ADMIN — HYDROMORPHONE HYDROCHLORIDE 0.2 MILLIGRAM(S): 2 INJECTION INTRAMUSCULAR; INTRAVENOUS; SUBCUTANEOUS at 06:01

## 2018-01-01 RX ADMIN — Medication 400 MILLIGRAM(S): at 06:42

## 2018-01-01 RX ADMIN — Medication 400 MILLIGRAM(S): at 22:11

## 2018-01-01 RX ADMIN — SODIUM CHLORIDE 75 MILLILITER(S): 9 INJECTION INTRAMUSCULAR; INTRAVENOUS; SUBCUTANEOUS at 02:32

## 2018-01-01 RX ADMIN — PIPERACILLIN AND TAZOBACTAM 25 GRAM(S): 4; .5 INJECTION, POWDER, LYOPHILIZED, FOR SOLUTION INTRAVENOUS at 17:51

## 2018-01-01 RX ADMIN — SODIUM CHLORIDE 1000 MILLILITER(S): 9 INJECTION INTRAMUSCULAR; INTRAVENOUS; SUBCUTANEOUS at 21:05

## 2018-01-01 RX ADMIN — MORPHINE SULFATE 1 MILLIGRAM(S): 50 CAPSULE, EXTENDED RELEASE ORAL at 10:42

## 2018-01-01 RX ADMIN — HYDROMORPHONE HYDROCHLORIDE 0.2 MILLIGRAM(S): 2 INJECTION INTRAMUSCULAR; INTRAVENOUS; SUBCUTANEOUS at 15:35

## 2018-01-01 RX ADMIN — LACTULOSE 200 GRAM(S): 10 SOLUTION ORAL at 15:25

## 2018-01-01 RX ADMIN — HYDROMORPHONE HYDROCHLORIDE 0.2 MILLIGRAM(S): 2 INJECTION INTRAMUSCULAR; INTRAVENOUS; SUBCUTANEOUS at 05:46

## 2018-01-01 RX ADMIN — ENOXAPARIN SODIUM 40 MILLIGRAM(S): 100 INJECTION SUBCUTANEOUS at 17:32

## 2018-01-01 RX ADMIN — OXYCODONE AND ACETAMINOPHEN 1 TABLET(S): 5; 325 TABLET ORAL at 21:45

## 2018-01-01 RX ADMIN — ENOXAPARIN SODIUM 40 MILLIGRAM(S): 100 INJECTION SUBCUTANEOUS at 16:40

## 2018-01-01 RX ADMIN — LACTULOSE 200 GRAM(S): 10 SOLUTION ORAL at 18:22

## 2018-01-01 RX ADMIN — HYDROMORPHONE HYDROCHLORIDE 0.2 MILLIGRAM(S): 2 INJECTION INTRAMUSCULAR; INTRAVENOUS; SUBCUTANEOUS at 05:30

## 2018-01-01 RX ADMIN — INSULIN HUMAN 10 UNIT(S): 100 INJECTION, SOLUTION SUBCUTANEOUS at 08:51

## 2018-01-01 RX ADMIN — LACTULOSE 200 GRAM(S): 10 SOLUTION ORAL at 22:34

## 2018-01-01 RX ADMIN — HYDROMORPHONE HYDROCHLORIDE 0.2 MILLIGRAM(S): 2 INJECTION INTRAMUSCULAR; INTRAVENOUS; SUBCUTANEOUS at 22:16

## 2018-01-01 RX ADMIN — HYDROMORPHONE HYDROCHLORIDE 0.2 MILLIGRAM(S): 2 INJECTION INTRAMUSCULAR; INTRAVENOUS; SUBCUTANEOUS at 22:31

## 2018-01-01 RX ADMIN — ENOXAPARIN SODIUM 40 MILLIGRAM(S): 100 INJECTION SUBCUTANEOUS at 17:44

## 2018-07-26 NOTE — ED CDU PROVIDER INITIAL DAY NOTE - MEDICAL DECISION MAKING DETAILS
57 yo F here for abdominal pain with palpable pass. US concerning for mass/CA. Sent to CDU for MRI. outpatient fu likely.

## 2018-07-26 NOTE — ED CDU PROVIDER INITIAL DAY NOTE - OBJECTIVE STATEMENT
58F no pmh p/w right upper abdominal pain x 2 days.  Pt has had constant pain on right side of abdomen and has been feeling a "hardness" in the area.  no fever, chills, nausea/vomiting/diarrhea, chest pain, shortness of breath.  Daughter states patient has had decreased appetite, but otherwise no symptoms.  Pt is visiting her daughter and does not live in [place].  Took advil yesterday.

## 2018-07-26 NOTE — ED PROVIDER NOTE - ATTENDING CONTRIBUTION TO CARE
58F o/w healthy presents with RUQ abd pain, constant pain for 2d. Also notes some swelling in that area. Visiting family from [place]. No n/v/d, no fever, no SOB. On exam well appearing, nad, mmm, rrr, lungs clear, abd + hepatomegaly with mild ttp, 2+ pulses, no edema, no rash, speech clear, no focal neuro deficits. Plan for labs, RUQ u/s.

## 2018-07-26 NOTE — ED PROVIDER NOTE - MEDICAL DECISION MAKING DETAILS
58F p/w 2 days of right abdominal pain and firmness.  Hepatomegaly on exam.  No f/c, nausea/vomiting/diarrhea.  Will obtain labs, LFTs, RUQ us and reassess.  - Emani Hannon DO

## 2018-07-26 NOTE — ED ADULT TRIAGE NOTE - CHIEF COMPLAINT QUOTE
Pt presents with right sided abdominal pain starting 2 days ago, denies vomiting, diarrhea, dysuria, chest pain, SOB.

## 2018-07-26 NOTE — ED PROVIDER NOTE - OBJECTIVE STATEMENT
58F no pmh p/w right upper abdominal pain x 2 days.  Pt has had constant pain on right side of abdomen and has been feeling a "hardness" in the area. 58F no pmh p/w right upper abdominal pain x 2 days.  Pt has had constant pain on right side of abdomen and has been feeling a "hardness" in the area.  no fever, chills, nausea/vomiting/diarrhea, chest pain, shortness of breath.  Daughter states patient has had decreased appetite, but otherwise no symptoms.  Pt is visiting her [place] and [place] not live in [place].  Took advil yesterday.    - Emani Hannon,

## 2018-07-26 NOTE — ED ADULT NURSE NOTE - OBJECTIVE STATEMENT
pt c.o. rt upper abd pain x 2 days with c.o. feeling hardness in area. abd soft, c.o. pain with palpation of Rt upper quadrant. sl placed labs sent. pt awaiting ultrasound.

## 2018-07-26 NOTE — ED PROVIDER NOTE - CARE PLAN
Morphology Per Location (Optional): Dark macule Detail Level: Detailed Size Of Lesion: 1 mm Principal Discharge DX:	Liver masses

## 2018-07-26 NOTE — ED PROVIDER NOTE - PHYSICAL EXAMINATION
Gen: NAD, AOx3, well appearing, walking around exam room  Head: NCAT  HEENT: PERRL, oral mucosa moist, normal conjunctiva  Lung: CTAB, no respiratory distress  CV: rrr, no murmurs, Normal perfusion  Abd: soft, nondistended, Palpable/enlarged liver, no CVA tenderness  MSK: No edema, no visible deformities  Neuro: No focal neurologic deficits, normal gait  Skin: No rash   - Emani Hannon, DO

## 2018-07-26 NOTE — ED CDU PROVIDER INITIAL DAY NOTE - ATTENDING CONTRIBUTION TO CARE
CDU MD THOMPSON:  I performed a face to face bedside interview with patient regarding history of present illness, review of symptoms and past medical history. I completed an independent physical exam.  I have discussed patient's plan of care with PA.   I agree with note as stated above, having amended the EMR as needed to reflect my findings. I have discussed the assessment and plan of care.  This includes during the time I functioned as the attending physician for this patient.    59 y/o female no medical care with abd pain, abn u/s, cdu for mri and pain control.

## 2018-07-27 NOTE — H&P ADULT - NSHPREVIEWOFSYSTEMS_GEN_ALL_CORE
REVIEW OF SYSTEMS:    CONSTITUTIONAL: No fever, weight loss, or fatigue  EYES: No eye pain, visual disturbances, or discharge  ENMT:  No difficulty hearing; No sinus or throat pain  NECK: No pain or stiffness  BREASTS: No pain, masses, or nipple discharge  RESPIRATORY: No cough, wheezing, chills or hemoptysis; No shortness of breath  CARDIOVASCULAR: No chest pain, palpitations, dizziness, or leg swelling  GASTROINTESTINAL: + abdominal pain- see HPI. No nausea, vomiting, or hematemesis; No diarrhea or constipation. No melena or hematochezia.  GENITOURINARY: No dysuria, frequency, hematuria, or incontinence  NEUROLOGICAL: No headaches, memory loss, loss of strength, numbness, or tremors  SKIN: No itching, burning, rashes, or lesions   LYMPH NODES: No enlarged glands  MUSCULOSKELETAL: No joint pain or swelling; No muscle, back, or extremity pain  HEME/LYMPH: No easy bruising,   ALLERY AND IMMUNOLOGIC: No known food or medication allergy

## 2018-07-27 NOTE — ED CDU PROVIDER DISPOSITION NOTE - ATTENDING CONTRIBUTION TO CARE
Pt was seen and evaluated by me. Pt is a 59 y/o female with no significant PMHx who presented to the ED for RUQ abd pain X 2 days. Pt states over the past 2 days having increased RUQ discomfort described as a hardness. Pt denies any fever, chills, nausea, vomiting, SOB, chest pain, or diarrhea. Pt had an US that showed bilobar infiltrative liver disease. Sent to OBS for MRI. Lungs CTA b/l. RRR. Abd with mild RUQ tenderness.

## 2018-07-27 NOTE — ED CDU PROVIDER SUBSEQUENT DAY NOTE - ATTENDING CONTRIBUTION TO CARE
Pt was seen and evaluated by me. Pt is a 57 y/o female with no significant PMHx who presented to the ED for RUQ abd pain X 2 days. PT states over the past 2 days having increased RUQ discomfort described as a hardness. Pt denies any fever, chills, nausea, vomiting, SOB, chest pain, or diarrhea. Pt had an US that showed bilobar infiltrative liver disease. Sent to OBS for MRI. Lungs CTA b/l. RRR. Abd with mild RUQ tenderness.

## 2018-07-27 NOTE — H&P ADULT - NSHPPHYSICALEXAM_GEN_ALL_CORE
Vital Signs Last 24 Hrs  T(C): 36.8 (27 Jul 2018 10:24), Max: 37.1 (26 Jul 2018 15:16)  T(F): 98.3 (27 Jul 2018 10:24), Max: 98.7 (26 Jul 2018 15:16)  HR: 80 (27 Jul 2018 10:24) (62 - 96)  BP: 120/76 (27 Jul 2018 10:24) (120/76 - 153/100)  BP(mean): --  RR: 14 (27 Jul 2018 10:24) (14 - 18)  SpO2: 100% (27 Jul 2018 10:24) (99% - 100%) Vital Signs Last 24 Hrs  T(C): 36.8 (27 Jul 2018 10:24), Max: 37.1 (26 Jul 2018 15:16)  T(F): 98.3 (27 Jul 2018 10:24), Max: 98.7 (26 Jul 2018 15:16)  HR: 80 (27 Jul 2018 10:24) (62 - 96)  BP: 120/76 (27 Jul 2018 10:24) (120/76 - 153/100)  BP(mean): --  RR: 14 (27 Jul 2018 10:24) (14 - 18)  SpO2: 100% (27 Jul 2018 10:24) (99% - 100%)  PHYSICAL EXAM:  Constitutional: NAD  Eyes: PERRL, EOMI, no scleral icterus or injection  ENMT: supple, no lymphadenopathy, no palpable mass  Respiratory: CTA b/l , no wheezing, or ronchi  Cardiovascular: RRR, +S1/S2, no murmur appreciated   Gastrointestinal: Soft, NT/ND, +BS   Extremities: Warm and well perfused   Vascular: +DP/PT pulses   Neurological: AX0x3, Non focal. Moves all extremities. Follows commands. Stregnth in UE and LE 5/5  Skin: unremarkable   Psychiatric: Normal mood and affect

## 2018-07-27 NOTE — H&P ADULT - NSHPSOCIALHISTORY_GEN_ALL_CORE
Lives in [place]  Visiting her daughter who lives here in the [place] w/ initial plan to return to [place] in [place]  Denies ever using tobacco. Denies ETOH or illicit drugs. Denies OTC meds or herbal remedies

## 2018-07-27 NOTE — ED CDU PROVIDER SUBSEQUENT DAY NOTE - PROGRESS NOTE DETAILS
[facility]  ID#[id number] used.  Pt notes mild intermittent pain to RUQ.  Pt agreeable with MRI.  Pt has no contraindications to MRI.  Pt allows medical team to speak with daughter and son in law regarding her plan of care.  Abdomen soft, nontender, no guarding, no rigidity. MRI shows multiple bilobar liver lesions with concern for cancer.  Pt accepted to Dr. Raza.  MAR text paged at this time.

## 2018-07-27 NOTE — H&P ADULT - NSHPLABSRESULTS_GEN_ALL_CORE
CBC Full  -  ( 26 Jul 2018 17:30 )  WBC Count : 7.30 K/uL  Hemoglobin : 10.2 g/dL  Hematocrit : 34.7 %  Platelet Count - Automated : 194 K/uL  Mean Cell Volume : 62.3 fL  Mean Cell Hemoglobin : 18.3 pg  Mean Cell Hemoglobin Concentration : 29.4 %  Auto Neutrophil # : 5.08 K/uL  Auto Lymphocyte # : 1.48 K/uL  Auto Monocyte # : 0.56 K/uL  Auto Eosinophil # : 0.11 K/uL  Auto Basophil # : 0.02 K/uL  Auto Neutrophil % : 69.5 %  Auto Lymphocyte % : 20.3 %  Auto Monocyte % : 7.7 %  Auto Eosinophil % : 1.5 %  Auto Basophil % : 0.3 %  07-26    137  |  101  |  14  ----------------------------<  90  4.1   |  24  |  0.78    Ca    9.1      26 Jul 2018 17:30    TPro  7.7  /  Alb  4.2  /  TBili  0.9  /  DBili  x   /  AST  78<H>  /  ALT  39<H>  /  AlkPhos  198<H>  07-26    Imaging Personally Reviewed:  EXAM:  MR ABDOMEN IC        PROCEDURE DATE:  Jul 27 2018         INTERPRETATION:  CLINICAL INFORMATION: Right upper abdominal pain for 2   days and palpable mass. Infiltrative liver disease on ultrasound.   Elevated liver enzymes and alkaline phosphatase.    COMPARISON: Abdominal ultrasound performed 7/26/2018.    PROCEDURE:   MRI of the abdomen was performed with and without intravenous contrast.  7.5 cc intravenous Gadavist was injected without complication. 0 cc were   discarded.  MRCP was performed.    FINDINGS:    LOWER CHEST: Normal.    LIVER: Noncirrhotic. No significant steatosis.   LESIONS: Bilobar T2 hyperintense, hypoenhancing, diffusion restricting   lesions, many confluent with near replacement of the right lobe.   Reference lesions as follows:  --12.1 x 6.8 cm right lobe (series 16, image 22).  --7.9 x 5.1 cm partially exophytic lateral left lobe (series 16, image   18).    VESSELS: Conventional hepatic arterial anatomy. Patent portal veins. Left   hepatic vein is mildly attenuated by a left lobe mass at the dome,   however, remains patent. Patent right and middle hepatic veins.    BILE DUCTS: Normal caliber.  GALLBLADDER: Cholelithiasis.  SPLEEN: Normal.  PANCREAS: Normal.  ADRENALS: 8 mm left adrenal adenoma.  KIDNEYS/URETERS: Subcentimeter bilateral cysts. No hydronephrosis.    VISUALIZED PORTIONS:    BOWEL: Within normal limits.   PERITONEUM: Trace perihepatic ascites.  RETROPERITONEUM: Prominent subcentimeter retroperitoneal and periportal   lymph nodes with a reference portacaval node measuring 0.8 cm in short   axis (series 15, image 69).  ABDOMINAL WALL: Within normal limits.  BONES: Multilevel degenerative change.    IMPRESSION:     1.  Noncirrhotic liver with numerous bilobar liver lesions, suspicious   for diffuse tumor. Primary consideration is metastatic disease, though   primary liver malignancy cannot be entirely excluded. Patent portal and   hepatic veins.  2.  Cholelithiasis.

## 2018-07-27 NOTE — H&P ADULT - PROBLEM SELECTOR PLAN 3
Mild. Pattern c/w hepatocellular etiology likely in setting of multiple liver lesions   -Evaluate liver lesions as stated above  -monitor LFTs\  -since pt has not been followed by a PMD will also obtain HIV and Hepatitis panel

## 2018-07-27 NOTE — H&P ADULT - PROBLEM SELECTOR PLAN 2
Numerous bilobar liver lesions seen on MRI. In the setting of multiple lesions, and microcytic anemia in a menopausal female w/o respiratory symptoms suspect primary in GI tract vs primary liver (less likely).  -will obtain CT chest to evaluate for lung pathology    -will consult GI for possible colonoscopy

## 2018-07-27 NOTE — H&P ADULT - HISTORY OF PRESENT ILLNESS
57 yo F no PMHx p/w RUQ abdominal pain. Pain Used [facility]  [id number]    57 yo F visiting from [place] no PMHx p/w RUQ abdominal pain. Pain started about 3 days ago. She describes the pain as dull and constant without radiation. She states overall it has been mild but worse with palpation and her daughter wanted her to be evaluated. She had diarrhea that self resolved a month ago. Otherwise she denies fevers, chills, nausea, vomiting, constipation, weight loss, trauma, association of pain with food or position.  She reports being healthy and has not followed up with a PMD. She has never obtained a colonoscopy, PAP smear or mammogram as part of routine screening.

## 2018-07-27 NOTE — H&P ADULT - PROBLEM SELECTOR PLAN 1
Pt currently w/o abdominal pain. She reports it as mild and increased in intensity mostly with palpation. Likely 2/2 underlining liver lesions. + Chololithiasis noted. However, symptoms not c/w biliary colic.   -serial abdominal exams  -pt not currently requiring pain medication.   -PRN zofran if devolops nausea

## 2018-07-27 NOTE — H&P ADULT - PROBLEM SELECTOR PLAN 4
Microcytic anemia. Pt states that she has been in menopause for about 2 years. No prior malignancy screening. No baseline to compare  -iron studies in the am  -malignancy w/u as stated above

## 2018-07-27 NOTE — ED CDU PROVIDER DISPOSITION NOTE - CLINICAL COURSE
Pt is a 59 y/o female with no significant PMHx who presented to the ED for RUQ abd pain X 2 days. Pt states over the past 2 days having increased RUQ discomfort described as a hardness. Pt denies any fever, chills, nausea, vomiting, SOB, chest pain, or diarrhea. Pt had an US that showed bilobar infiltrative liver disease. Sent to OBS for MRI. MRI showed a diffuse hepatic tumor and pt was admitted for further evaluation. Lungs CTA b/l. RRR. Abd with mild RUQ tenderness.

## 2018-07-28 NOTE — PROGRESS NOTE ADULT - SUBJECTIVE AND OBJECTIVE BOX
CESAR SIMS  58y  Female      Patient is a 58y old  Female who presents with a chief complaint of RUQ abdominal Pain (27 Jul 2018 14:23)  Patient seen and examined.chart reviewed.c/o ruq.pain on and off,no fever.She had similar problem about 8 yrs ago,In [place],but no diagnosis.She did not f/u with any physcian.no cp,no sob,no fever    REVIEW OF SYSTEMS:  as above      INTERVAL HPI/OVERNIGHT EVENTS:  T(C): 36.8 (07-28-18 @ 13:40), Max: 37 (07-27-18 @ 20:54)  HR: 77 (07-28-18 @ 13:40) (68 - 77)  BP: 116/76 (07-28-18 @ 13:40) (116/76 - 131/82)  RR: 18 (07-28-18 @ 13:40) (17 - 18)  SpO2: 99% (07-28-18 @ 13:40) (98% - 99%)  Wt(kg): --  I&O's Summary    T(C): 36.8 (07-28-18 @ 13:40), Max: 37 (07-27-18 @ 20:54)  HR: 77 (07-28-18 @ 13:40) (68 - 77)  BP: 116/76 (07-28-18 @ 13:40) (116/76 - 131/82)  RR: 18 (07-28-18 @ 13:40) (17 - 18)  SpO2: 99% (07-28-18 @ 13:40) (98% - 99%)  Wt(kg): --Vital Signs Last 24 Hrs  T(C): 36.8 (28 Jul 2018 13:40), Max: 37 (27 Jul 2018 20:54)  T(F): 98.2 (28 Jul 2018 13:40), Max: 98.6 (27 Jul 2018 20:54)  HR: 77 (28 Jul 2018 13:40) (68 - 77)  BP: 116/76 (28 Jul 2018 13:40) (116/76 - 131/82)  BP(mean): --  RR: 18 (28 Jul 2018 13:40) (17 - 18)  SpO2: 99% (28 Jul 2018 13:40) (98% - 99%)    LABS:                        10.4   6.72  )-----------( 200      ( 28 Jul 2018 05:30 )             35.3     07-28    139  |  101  |  13  ----------------------------<  81  4.3   |  26  |  0.80    Ca    9.3      28 Jul 2018 05:30    TPro  7.3  /  Alb  3.8  /  TBili  1.3<H>  /  DBili  x   /  AST  75<H>  /  ALT  38<H>  /  AlkPhos  179<H>  07-28    PT/INR - ( 26 Jul 2018 17:30 )   PT: 12.2 SEC;   INR: 1.06          PTT - ( 26 Jul 2018 17:30 )  PTT:29.9 SEC    CAPILLARY BLOOD GLUCOSE                PAST MEDICAL & SURGICAL HISTORY:  No pertinent past medical history  No significant past surgical history      MEDICATIONS  (STANDING):  enoxaparin Injectable 40 milliGRAM(s) SubCutaneous every 24 hours    MEDICATIONS  (PRN):  ondansetron Injectable 4 milliGRAM(s) IV Push every 6 hours PRN Vomiting        RADIOLOGY & ADDITIONAL TESTS:    Imaging Personally Reviewed:  [ ] YES  [ ] NO    Consultant(s) Notes Reviewed:  [ ] YES  [ ] NO    PHYSICAL EXAM:  GENERAL: NAD, well-groomed, well-developed  HEAD:  Atraumatic, Normocephalic  EYES: EOMI, PERRLA, conjunctiva and sclera clear  ENMT: No tonsillar erythema, exudates, or enlargement; Moist mucous membranes, Good dentition, No lesions  NECK: Supple, No JVD, Normal thyroid  NERVOUS SYSTEM:  Alert & Oriented X3, Good concentration; Motor Strength 5/5 B/L upper and lower extremities; DTRs 2+ intact and symmetric  CHEST/LUNG: Clear to percussion bilaterally; No rales, rhonchi, wheezing, or rubs  HEART: Regular rate and rhythm; No murmurs, rubs, or gallops  ABDOMEN: Soft, Nontender, Nondistended; Bowel sounds present .Hepatomegaly.  EXTREMITIES:  2+ Peripheral Pulses, No clubbing, cyanosis, or edema  LYMPH: No lymphadenopathy noted  SKIN: No rashes or lesions    Care Discussed with Consultants/Other Providers [ ] YES  [ ] NO      Code Status: [] Full Code [] DNR [] DNI [] Goals of Care:   Disposition: [] ICU [] Stroke Unit [] RCU []PCU []Floor [] Discharge Home         COSMO DaveFACP

## 2018-07-28 NOTE — PROGRESS NOTE ADULT - ASSESSMENT
57 yo F visiting from [place] no PMHx p/w RUQ abdominal pain  found to have transaminitis, with numerous bilobar liver lesions seen on MRI, suspicious for diffuse tumor

## 2018-07-29 NOTE — PROGRESS NOTE ADULT - ASSESSMENT
59 yo F visiting from [place] no PMHx p/w RUQ abdominal pain  found to have transaminitis, with numerous bilobar liver lesions seen on MRI, suspicious for diffuse tumor

## 2018-07-29 NOTE — PROGRESS NOTE ADULT - ASSESSMENT
57 yo F from [place], jenni speaking, with no sig PMHx presenting with RUQ abdominal pain.    1)Liver Lesions seen on CT  DDx: HCC (primary) vs. Metastatic disease    -would recommend IR for liver biopsy to evaluate liver lesions  -trend LFTs daily  - rest of plan as per primary team

## 2018-07-29 NOTE — PROGRESS NOTE ADULT - SUBJECTIVE AND OBJECTIVE BOX
CESAR SIMS  58y  Female      Patient is a 58y old  Female who presents with a chief complaint of RUQ abdominal Pain (27 Jul 2018 14:23)  Patient is comfortable,mild discomfort in rt.uper quadrant abd.area,no n/v,no fever,no cp,no sob    REVIEW OF SYSTEMS:  CONSTITUTIONAL: No fever, weight loss, or fatigue  EYES: No eye pain, visual disturbances, or discharge  ENMT:  No difficulty hearing, tinnitus, vertigo; No sinus or throat pain  NECK: No pain or stiffness  RESPIRATORY: No cough, wheezing, chills or hemoptysis; No shortness of breath  CARDIOVASCULAR: No chest pain, palpitations, dizziness, or leg swelling  GASTROINTESTINAL: No abdominal or epigastric pain. No nausea, vomiting, or hematemesis; No diarrhea or constipation. No melena or hematochezia.  GENITOURINARY: No dysuria, frequency, hematuria, or incontinence  NEUROLOGICAL: No headaches, memory loss, loss of strength, numbness, or tremors  SKIN: No itching, burning, rashes, or lesions   ENDOCRINE: No heat or cold intolerance; No hair loss  MUSCULOSKELETAL: No joint pain or swelling; No muscle, back, or extremity pain  PSYCHIATRIC: No depression, anxiety, mood swings, or difficulty sleeping  HEME/LYMPH: No easy bruising, or bleeding gums        INTERVAL HPI/OVERNIGHT EVENTS:  T(C): 36.7 (07-29-18 @ 14:37), Max: 37.1 (07-28-18 @ 21:29)  HR: 74 (07-29-18 @ 14:37) (74 - 91)  BP: 124/82 (07-29-18 @ 14:37) (112/75 - 124/82)  RR: 18 (07-29-18 @ 14:37) (18 - 18)  SpO2: 98% (07-29-18 @ 14:37) (98% - 100%)  Wt(kg): --  I&O's Summary    T(C): 36.7 (07-29-18 @ 14:37), Max: 37.1 (07-28-18 @ 21:29)  HR: 74 (07-29-18 @ 14:37) (74 - 91)  BP: 124/82 (07-29-18 @ 14:37) (112/75 - 124/82)  RR: 18 (07-29-18 @ 14:37) (18 - 18)  SpO2: 98% (07-29-18 @ 14:37) (98% - 100%)  Wt(kg): --Vital Signs Last 24 Hrs  T(C): 36.7 (29 Jul 2018 14:37), Max: 37.1 (28 Jul 2018 21:29)  T(F): 98.1 (29 Jul 2018 14:37), Max: 98.7 (28 Jul 2018 21:29)  HR: 74 (29 Jul 2018 14:37) (74 - 91)  BP: 124/82 (29 Jul 2018 14:37) (112/75 - 124/82)  BP(mean): --  RR: 18 (29 Jul 2018 14:37) (18 - 18)  SpO2: 98% (29 Jul 2018 14:37) (98% - 100%)    LABS:                        10.1   5.94  )-----------( 205      ( 29 Jul 2018 06:33 )             33.5     07-29    139  |  101  |  12  ----------------------------<  83  4.0   |  25  |  0.74    Ca    9.1      29 Jul 2018 06:33  Phos  3.8     07-29  Mg     2.3     07-29    TPro  6.9  /  Alb  3.7  /  TBili  1.0  /  DBili  x   /  AST  73<H>  /  ALT  35<H>  /  AlkPhos  187<H>  07-29        CAPILLARY BLOOD GLUCOSE                PAST MEDICAL & SURGICAL HISTORY:  No pertinent past medical history  No significant past surgical history      MEDICATIONS  (STANDING):  enoxaparin Injectable 40 milliGRAM(s) SubCutaneous every 24 hours    MEDICATIONS  (PRN):  ondansetron Injectable 4 milliGRAM(s) IV Push every 6 hours PRN Vomiting        RADIOLOGY & ADDITIONAL TESTS:    Imaging Personally Reviewed:  [ ] YES  [ ] NO    Consultant(s) Notes Reviewed:  [x ] YES  [ ] NO    PHYSICAL EXAM:  GENERAL: NAD, well-groomed, well-developed  HEAD:  Atraumatic, Normocephalic  EYES: EOMI, PERRLA, conjunctiva and sclera clear  ENMT: No tonsillar erythema, exudates, or enlargement; Moist mucous membranes, Good dentition, No lesions  NECK: Supple, No JVD, Normal thyroid  NERVOUS SYSTEM:  Alert & Oriented X3, Good concentration; Motor Strength 5/5 B/L upper and lower extremities; DTRs 2+ intact and symmetric  CHEST/LUNG: Clear to percussion bilaterally; No rales, rhonchi, wheezing, or rubs  HEART: Regular rate and rhythm; No murmurs, rubs, or gallops  ABDOMEN: Soft, Nontender, Nondistended; Bowel sounds present.Hepatomegaly  EXTREMITIES:  2+ Peripheral Pulses, No clubbing, cyanosis, or edema  LYMPH: No lymphadenopathy noted  SKIN: No rashes or lesions    Care Discussed with Consultants/Other Providers [ ] YES  [ ] NO      Code Status: [] Full Code [] DNR [] DNI [] Goals of Care:   Disposition: [] ICU [] Stroke Unit [] RCU []PCU []Floor [] Discharge Home         GEOFFREY Dave.FACP

## 2018-07-29 NOTE — PROGRESS NOTE ADULT - SUBJECTIVE AND OBJECTIVE BOX
Chief Complaint:  Patient is a 58y old  Female who presents with a chief complaint of RUQ abdominal Pain (27 Jul 2018 14:23)      Interval Events:   57 yo F from [place], jenni speaking, with no sig PMHx presenting with RUQ abdominal pain.    Pain started about 3 days ago. She describes the pain as dull and constant without radiation. She describes it as feeling a "hardness" in the area. She states overall it has been mild but worse with palpation and her daughter wanted her to be evaluated. She had diarrhea that self resolved a month ago. Otherwise she denies fevers, chills, nausea, vomiting, constipation, weight loss, trauma, association of pain with food or position.  She has had some decreased appetite. She reports being healthy and has not followed up with a PMD. She has never obtained a colonoscopy, PAP smear or mammogram as part of routine screening.     Had a brother with hx of liver disease 2/2 alcohol use but no other family hx of liver problems. She reports that she was told she had liver issues 6-7 years ago and was hospitalized for 5-6 days. She does not know what her diagnosis was after that hospitalization and was never told she had liver issues following that time.    In the ED, vitals stable. Hb of 10. BMP WNL. INR of 1.06. Plts of 200. BMP WNL. T bill of 1.3 alk pos of 179 AST and ALT of 75 and 38. Albumin 3.8. Ferritin 531. US abdomen was done which showed multiple hepatic lesions, larges of 7cm I the left hepatic lobe. MRI abdomen was done showing non cirrhotic liver with numerous bilobar liver lesions and cholelithiasis. A CT chest was done show a few 3mm pulmonary nodules. HIV negative. GI consulted for further management of liver lesions.  Allergies:  No Known Allergies      Home Medications:    Hospital Medications:  enoxaparin Injectable 40 milliGRAM(s) SubCutaneous every 24 hours  ondansetron Injectable 4 milliGRAM(s) IV Push every 6 hours PRN      PMHX/PSHX:  No pertinent past medical history  No significant past surgical history      Family history:  Family history of cancer (Sibling)      ROS:     General:  No wt loss, fevers, chills, night sweats, fatigue,   Eyes:  Good vision, no reported pain  ENT:  No sore throat, pain, runny nose, dysphagia  CV:  No pain, palpitations, hypo/hypertension  Resp:  No dyspnea, cough, tachypnea, wheezing  GI:  No pain, No nausea, No vomiting, No diarrhea, No constipation, No weight loss, No fever, No pruritis, No rectal bleeding, No tarry stools, No dysphagia,  :  No pain, bleeding, incontinence, nocturia  Muscle:  No pain, weakness  Neuro:  No weakness, tingling, memory problems  Psych:  No fatigue, insomnia, mood problems, depression  Endocrine:  No polyuria, polydipsia, cold/heat intolerance  Heme:  No petechiae, ecchymosis, easy bruisability  Skin:  No rash, tattoos, scars, edema      PHYSICAL EXAM:   Vital Signs:  Vital Signs Last 24 Hrs  T(C): 36.6 (29 Jul 2018 06:30), Max: 37.1 (28 Jul 2018 21:29)  T(F): 97.9 (29 Jul 2018 06:30), Max: 98.7 (28 Jul 2018 21:29)  HR: 75 (29 Jul 2018 06:30) (75 - 91)  BP: 112/75 (29 Jul 2018 06:30) (112/75 - 116/80)  BP(mean): --  RR: 18 (29 Jul 2018 06:30) (18 - 18)  SpO2: 100% (29 Jul 2018 06:30) (100% - 100%)  Daily Height in cm: 154.94 (28 Jul 2018 21:29)    Daily     GENERAL:  Appears stated age, well-groomed, well-nourished, no distress  HEENT:  NC/AT,  conjunctivae clear and pink, no thyromegaly, nodules, adenopathy, no JVD, sclera -anicteric  CHEST:  Full & symmetric excursion, no increased effort, breath sounds clear  HEART:  Regular rhythm, S1, S2, no murmur/rub/S3/S4, no abdominal bruit, no edema  ABDOMEN:  Soft, non-tender, non-distended, normoactive bowel sounds,  no masses ,no hepato-splenomegaly, no signs of chronic liver disease  EXTEREMITIES:  no cyanosis,clubbing or edema  SKIN:  No rash/erythema/ecchymoses/petechiae/wounds/abscess/warm/dry  NEURO:  Alert, oriented, no asterixis, no tremor, no encephalopathy    LABS:                        10.1   5.94  )-----------( 205      ( 29 Jul 2018 06:33 )             33.5     07-29    139  |  101  |  12  ----------------------------<  83  4.0   |  25  |  0.74    Ca    9.1      29 Jul 2018 06:33  Phos  3.8     07-29  Mg     2.3     07-29    TPro  6.9  /  Alb  3.7  /  TBili  1.0  /  DBili  x   /  AST  73<H>  /  ALT  35<H>  /  AlkPhos  187<H>  07-29    LIVER FUNCTIONS - ( 29 Jul 2018 06:33 )  Alb: 3.7 g/dL / Pro: 6.9 g/dL / ALK PHOS: 187 u/L / ALT: 35 u/L / AST: 73 u/L / GGT: x                   Imaging:  < from: MR Abdomen w/ IV Cont (07.27.18 @ 09:48) >  FINDINGS:    LOWER CHEST: Normal.    LIVER: Noncirrhotic. No significant steatosis.   LESIONS: Bilobar T2 hyperintense, hypoenhancing, diffusion restricting   lesions, many confluent with near replacement of the right lobe.   Reference lesions as follows:  --12.1 x 6.8 cm right lobe (series 16, image 22).  --7.9 x 5.1 cm partially exophytic lateral left lobe (series 16, image   18).    VESSELS: Conventional hepatic arterial anatomy. Patent portal veins. Left   hepatic vein is mildly attenuated by a left lobe mass at the dome,   however, remains patent. Patent right and middle hepatic veins.    BILE DUCTS: Normal caliber.  GALLBLADDER: Cholelithiasis.  SPLEEN: Normal.  PANCREAS: Normal.  ADRENALS: 8 mm left adrenal adenoma.  KIDNEYS/URETERS: Subcentimeter bilateral cysts. No hydronephrosis.    VISUALIZED PORTIONS:    BOWEL: Within normal limits.   PERITONEUM: Trace perihepatic ascites.  RETROPERITONEUM: Prominent subcentimeter retroperitoneal and periportal   lymph nodes with areference portacaval node measuring 0.8 cm in short   axis (series 15, image 69).  ABDOMINAL WALL: Within normal limits.  BONES: Multilevel degenerative change.    IMPRESSION:     1.  Noncirrhotic liver with numerous bilobar liver lesions, suspicious  for diffuse tumor. Primary consideration is metastatic disease, though   primary liver malignancy cannot be entirely excluded. Patent portal and   hepatic veins.  2.  Cholelithiasis.    < end of copied text >

## 2018-07-29 NOTE — CONSULT NOTE ADULT - ASSESSMENT
59 yo F from [place], jenni speaking, with no sig PMHx presenting with RUQ abdominal pain.    1)Liver Lesions seen on CT (without evidence of cirrhosis)  DDx: HCC (primary) vs. Metastatic disease    -would recommend IR for liver biopsy to evaluate liver lesions  -please check hepatitis serologies (hepatitis B)  -trend LFTs daily  - rest of plan as per primary team 59 yo F from [place], jenni speaking, with no sig PMHx presenting with RUQ abdominal pain.    Impression:    1)Liver Lesions seen on CT (without evidence of cirrhosis)  DDx: HCC (primary) vs. Metastatic disease    Recommendations:  -Check hepatitis serologies (hepatitis B and C), CEA, and CA 19-9  -Would plan for IR consult for liver biopsy is work-up is negative  -Monitor daily liver enzymes

## 2018-07-30 NOTE — PROGRESS NOTE ADULT - SUBJECTIVE AND OBJECTIVE BOX
CESAR SIMS  58y  Female      Patient is a 58y old  Female who presents with a chief complaint of RUQ abdominal Pain (27 Jul 2018 14:23)  Patient is comfortable,denies abd.pain,no sob,no cp,no fever,no cough    REVIEW OF SYSTEMS:  as above    INTERVAL HPI/OVERNIGHT EVENTS:  T(C): 36.9 (07-30-18 @ 14:13), Max: 36.9 (07-29-18 @ 21:59)  HR: 86 (07-30-18 @ 14:13) (70 - 86)  BP: 107/67 (07-30-18 @ 14:13) (107/67 - 119/74)  RR: 18 (07-30-18 @ 14:13) (18 - 18)  SpO2: 99% (07-30-18 @ 14:13) (99% - 100%)  Wt(kg): --  I&O's Summary    T(C): 36.9 (07-30-18 @ 14:13), Max: 36.9 (07-29-18 @ 21:59)  HR: 86 (07-30-18 @ 14:13) (70 - 86)  BP: 107/67 (07-30-18 @ 14:13) (107/67 - 119/74)  RR: 18 (07-30-18 @ 14:13) (18 - 18)  SpO2: 99% (07-30-18 @ 14:13) (99% - 100%)  Wt(kg): --Vital Signs Last 24 Hrs  T(C): 36.9 (30 Jul 2018 14:13), Max: 36.9 (29 Jul 2018 21:59)  T(F): 98.4 (30 Jul 2018 14:13), Max: 98.4 (29 Jul 2018 21:59)  HR: 86 (30 Jul 2018 14:13) (70 - 86)  BP: 107/67 (30 Jul 2018 14:13) (107/67 - 119/74)  BP(mean): --  RR: 18 (30 Jul 2018 14:13) (18 - 18)  SpO2: 99% (30 Jul 2018 14:13) (99% - 100%)    LABS:                        9.7    6.95  )-----------( 194      ( 30 Jul 2018 05:35 )             33.9     07-30    139  |  101  |  11  ----------------------------<  81  4.7   |  25  |  0.72    Ca    9.3      30 Jul 2018 05:35  Phos  3.6     07-30  Mg     2.3     07-30    TPro  7.0  /  Alb  3.6  /  TBili  0.9  /  DBili  x   /  AST  83<H>  /  ALT  41<H>  /  AlkPhos  207<H>  07-30        CAPILLARY BLOOD GLUCOSE                PAST MEDICAL & SURGICAL HISTORY:  No pertinent past medical history  No significant past surgical history      MEDICATIONS  (STANDING):  enoxaparin Injectable 40 milliGRAM(s) SubCutaneous every 24 hours    MEDICATIONS  (PRN):  ondansetron Injectable 4 milliGRAM(s) IV Push every 6 hours PRN Vomiting        RADIOLOGY & ADDITIONAL TESTS:    Imaging Personally Reviewed:  [ ] YES  [ ] NO    Consultant(s) Notes Reviewed:  [ ] YES  [ ] NO    PHYSICAL EXAM:  GENERAL: NAD, well-groomed, well-developed  HEAD:  Atraumatic, Normocephalic  EYES: EOMI, PERRLA, conjunctiva and sclera clear  ENMT: No tonsillar erythema, exudates, or enlargement; Moist mucous membranes, Good dentition, No lesions  NECK: Supple, No JVD, Normal thyroid  NERVOUS SYSTEM:  Alert & Oriented X3, Good concentration; Motor Strength 5/5 B/L upper and lower extremities; DTRs 2+ intact and symmetric  CHEST/LUNG: Clear to percussion bilaterally; No rales, rhonchi, wheezing, or rubs  HEART: Regular rate and rhythm; No murmurs, rubs, or gallops  ABDOMEN: Soft, Nontender, Nondistended; Bowel sounds present,Hepatomegaly.  EXTREMITIES:  2+ Peripheral Pulses, No clubbing, cyanosis, or edema  LYMPH: No lymphadenopathy noted  SKIN: No rashes or lesions    Care Discussed with Consultants/Other Providers [ ] YES  [ ] NO      Code Status: [] Full Code [] DNR [] DNI [] Goals of Care:   Disposition: [] ICU [] Stroke Unit [] RCU []PCU []Floor [] Discharge Home         OMA DaveP

## 2018-07-31 NOTE — PROGRESS NOTE ADULT - ASSESSMENT
59 yo Emily speaking F with no known past medical history presenting with RUQ abdominal pain, found to have multiple lesions on MRI abdomen, with concern for primary liver malignancy versus metastatic caancer.    Impression:    1)Liver Lesions seen on CT (without evidence of cirrhosis): Differential diagnosis includes hepatocellular carcinoma versus metastatic malignancy. Hepatitis B and C serologies negative. Elevated AFP of 3281. Elevated CEA and CA 19-9.    Recommendations:  -Hematology/consult for treatment options  -Interventional Radiology consult for possible radio-embolization  -Monitor daily CMP    Cristian Erwin MD  Gastroenterology Fellow  Pager number: [phone] / [phone] 59 yo Emily speaking F with no known past medical history presenting with RUQ abdominal pain, found to have multiple lesions on MRI abdomen, with concern for primary liver malignancy versus metastatic caancer.    Impression:    1)Liver Lesions seen on CT (without evidence of cirrhosis): Differential diagnosis includes hepatocellular carcinoma versus metastatic malignancy. Hepatitis B and C serologies negative. Elevated AFP of 3281. Elevated CEA and CA 19-9. No enhancement seen on arterial phase of MRI more consistent with metastatic disease versus primary liver malignancy.    Recommendations:  -IR consult for liver biopsy  -Monitor daily CMP    Cristian Erwin MD  Gastroenterology Fellow  Pager number: [phone] / [phone] 59 yo Emily speaking F with no known past medical history presenting with RUQ abdominal pain, found to have multiple lesions on MRI abdomen, with concern for primary liver malignancy versus metastatic caancer.    Impression:    1)Liver Lesions seen on CT (without evidence of cirrhosis): Differential diagnosis includes hepatocellular carcinoma versus metastatic malignancy. Hepatitis B and C serologies negative. Likely represents hepatocellular carcinoma in setting of elevated AFP > 3000. Elevated CEA of 5.8 and CA 19-9 of 74.0.     Recommendations:  - F/U IR liver biopsy  - Monitor daily CMP  - Patient will be discussed in interdisciplinary liver conference on 8/6/18    Cristian Erwin MD  Gastroenterology Fellow  Pager number: [phone] / [phone]

## 2018-07-31 NOTE — PROGRESS NOTE ADULT - SUBJECTIVE AND OBJECTIVE BOX
Chief Complaint:  Patient is a 58y old  Female who presents with a chief complaint of RUQ abdominal Pain (27 Jul 2018 14:23)      Interval Events:     Allergies:  No Known Allergies        Hospital Medications:  enoxaparin Injectable 40 milliGRAM(s) SubCutaneous every 24 hours  ondansetron Injectable 4 milliGRAM(s) IV Push every 6 hours PRN      PMHX/PSHX:  No pertinent past medical history  No significant past surgical history      Family history:  Family history of cancer (Sibling)    ROS:    General:  No wt loss, fevers, chills, night sweats, fatigue,   Eyes:  Good vision, no reported pain  ENT:  No sore throat, pain, runny nose, dysphagia  CV:  No pain, palpitations, hypo/hypertension  Pulm:  No dyspnea, cough, tachypnea, wheezing  GI:  See HPI  :  No pain, bleeding, incontinence, nocturia  Skin:  No rash, edema    PHYSICAL EXAM:   Vital Signs:  Vital Signs Last 24 Hrs  T(C): 36.7 (31 Jul 2018 05:03), Max: 36.9 (30 Jul 2018 14:13)  T(F): 98.1 (31 Jul 2018 05:03), Max: 98.4 (30 Jul 2018 14:13)  HR: 72 (31 Jul 2018 05:03) (72 - 86)  BP: 119/74 (31 Jul 2018 05:03) (107/67 - 134/85)  BP(mean): --  RR: 17 (31 Jul 2018 05:03) (17 - 18)  SpO2: 100% (31 Jul 2018 05:03) (99% - 100%)    GENERAL:  Appears stated age, well-groomed, well-nourished, no distress  HEENT:  NC/AT,  conjunctivae clear and pink,  no JVD  CHEST:  Full & symmetric excursion, no increased effort, breath sounds clear  HEART:  Regular rhythm, S1, S2, no murmur/rub/S3/S4, no abdominal bruit, no edema  ABDOMEN:  Soft, non-tender, non-distended, normoactive bowel sounds,  no masses ,  EXTREMITIES: No edema  SKIN:  No rash/erythema/ecchymoses/petechiae/wounds/abscess/warm/dry  NEURO:  Alert, oriented    LABS:                        10.1   6.82  )-----------( 166      ( 31 Jul 2018 06:20 )             34.8     Mean Cell Volume: 62.7 fL (07-31-18 @ 06:20)    07-31    139  |  101  |  12  ----------------------------<  85  4.3   |  24  |  0.65    Ca    9.2      31 Jul 2018 06:20  Phos  3.6     07-30  Mg     2.3     07-30    TPro  7.1  /  Alb  3.7  /  TBili  0.9  /  DBili  x   /  AST  92<H>  /  ALT  50<H>  /  AlkPhos  218<H>  07-31    LIVER FUNCTIONS - ( 31 Jul 2018 06:20 )  Alb: 3.7 g/dL / Pro: 7.1 g/dL / ALK PHOS: 218 u/L / ALT: 50 u/L / AST: 92 u/L / GGT: x                      10.1   6.82  )-----------( 166      ( 31 Jul 2018 06:20 )             34.8                         9.7    6.95  )-----------( 194      ( 30 Jul 2018 05:35 )             33.9                         10.1   5.94  )-----------( 205      ( 29 Jul 2018 06:33 )             33.5     Imaging: No new imaging Chief Complaint:  Patient is a 58y old  Female who presents with a chief complaint of RUQ abdominal Pain (27 Jul 2018 14:23)    Interval Events:   - Patient endorses continued abdominal pain    Allergies:  No Known Allergies    Hospital Medications:  enoxaparin Injectable 40 milliGRAM(s) SubCutaneous every 24 hours  ondansetron Injectable 4 milliGRAM(s) IV Push every 6 hours PRN    PMHX/PSHX:  No pertinent past medical history  No significant past surgical history    Family history:  Family history of cancer (Sibling)    ROS:    General:  No wt loss, fevers, chills, night sweats, fatigue  Eyes:  Good vision, no reported pain  ENT:  No sore throat, pain, runny nose, dysphagia  CV:  No pain, palpitations, hypo/hypertension  Pulm:  No dyspnea, cough, tachypnea, wheezing  GI:  See HPI  :  No pain, bleeding, incontinence, nocturia  Skin:  No rash, edema    PHYSICAL EXAM:   Vital Signs:  Vital Signs Last 24 Hrs  T(C): 36.7 (31 Jul 2018 05:03), Max: 36.9 (30 Jul 2018 14:13)  T(F): 98.1 (31 Jul 2018 05:03), Max: 98.4 (30 Jul 2018 14:13)  HR: 72 (31 Jul 2018 05:03) (72 - 86)  BP: 119/74 (31 Jul 2018 05:03) (107/67 - 134/85)  BP(mean): --  RR: 17 (31 Jul 2018 05:03) (17 - 18)  SpO2: 100% (31 Jul 2018 05:03) (99% - 100%)    GENERAL:  Appears stated age, well-groomed, well-nourished, no distress  HEENT:  NC/AT,  conjunctivae clear and pink,  no JVD  CHEST:  Full & symmetric excursion, no increased effort, breath sounds clear  HEART:  Regular rhythm, S1, S2, no murmur/rub/S3/S4, no abdominal bruit, no edema  ABDOMEN:  Soft, non-tender, non-distended, normoactive bowel sounds,  no masses ,  EXTREMITIES: No edema  SKIN:  No rash/erythema/ecchymoses/petechiae/wounds/abscess/warm/dry  NEURO:  Alert, oriented    LABS:                        10.1   6.82  )-----------( 166      ( 31 Jul 2018 06:20 )             34.8     Mean Cell Volume: 62.7 fL (07-31-18 @ 06:20)    07-31    139  |  101  |  12  ----------------------------<  85  4.3   |  24  |  0.65    Ca    9.2      31 Jul 2018 06:20  Phos  3.6     07-30  Mg     2.3     07-30    TPro  7.1  /  Alb  3.7  /  TBili  0.9  /  DBili  x   /  AST  92<H>  /  ALT  50<H>  /  AlkPhos  218<H>  07-31    LIVER FUNCTIONS - ( 31 Jul 2018 06:20 )  Alb: 3.7 g/dL / Pro: 7.1 g/dL / ALK PHOS: 218 u/L / ALT: 50 u/L / AST: 92 u/L / GGT: x                      10.1   6.82  )-----------( 166      ( 31 Jul 2018 06:20 )             34.8                         9.7    6.95  )-----------( 194      ( 30 Jul 2018 05:35 )             33.9                         10.1   5.94  )-----------( 205      ( 29 Jul 2018 06:33 )             33.5     Imaging: No new imaging

## 2018-07-31 NOTE — PROGRESS NOTE ADULT - SUBJECTIVE AND OBJECTIVE BOX
CESAR SIMS  58y  Female      Patient is a 58y old  Female who presents with a chief complaint of RUQ abdominal Pain (27 Jul 2018 14:23)  Patient c/o mild discomfort in RUQ area.no n/v,no fever,no cp,no cough,no sob    REVIEW OF SYSTEMS:  as above    INTERVAL HPI/OVERNIGHT EVENTS:  T(C): 37.2 (07-31-18 @ 20:49), Max: 37.2 (07-31-18 @ 20:49)  HR: 92 (07-31-18 @ 20:49) (72 - 92)  BP: 141/89 (07-31-18 @ 20:49) (119/74 - 141/89)  RR: 18 (07-31-18 @ 20:49) (17 - 18)  SpO2: 98% (07-31-18 @ 20:49) (98% - 100%)  Wt(kg): --  I&O's Summary    T(C): 37.2 (07-31-18 @ 20:49), Max: 37.2 (07-31-18 @ 20:49)  HR: 92 (07-31-18 @ 20:49) (72 - 92)  BP: 141/89 (07-31-18 @ 20:49) (119/74 - 141/89)  RR: 18 (07-31-18 @ 20:49) (17 - 18)  SpO2: 98% (07-31-18 @ 20:49) (98% - 100%)  Wt(kg): --Vital Signs Last 24 Hrs  T(C): 37.2 (31 Jul 2018 20:49), Max: 37.2 (31 Jul 2018 20:49)  T(F): 99 (31 Jul 2018 20:49), Max: 99 (31 Jul 2018 20:49)  HR: 92 (31 Jul 2018 20:49) (72 - 92)  BP: 141/89 (31 Jul 2018 20:49) (119/74 - 141/89)  BP(mean): --  RR: 18 (31 Jul 2018 20:49) (17 - 18)  SpO2: 98% (31 Jul 2018 20:49) (98% - 100%)    LABS:                        10.1   6.82  )-----------( 166      ( 31 Jul 2018 06:20 )             34.8     07-31    139  |  101  |  12  ----------------------------<  85  4.3   |  24  |  0.65    Ca    9.2      31 Jul 2018 06:20  Phos  3.6     07-30  Mg     2.3     07-30    TPro  7.1  /  Alb  3.7  /  TBili  0.9  /  DBili  x   /  AST  92<H>  /  ALT  50<H>  /  AlkPhos  218<H>  07-31        CAPILLARY BLOOD GLUCOSE                PAST MEDICAL & SURGICAL HISTORY:  No pertinent past medical history  No significant past surgical history      MEDICATIONS  (STANDING):  enoxaparin Injectable 40 milliGRAM(s) SubCutaneous every 24 hours    MEDICATIONS  (PRN):  ondansetron Injectable 4 milliGRAM(s) IV Push every 6 hours PRN Vomiting        RADIOLOGY & ADDITIONAL TESTS:    Imaging Personally Reviewed:  [ ] YES  [ ] NO    Consultant(s) Notes Reviewed:  x[ ] YES  [ ] NO    PHYSICAL EXAM:  GENERAL: NAD, well-groomed, well-developed  HEAD:  Atraumatic, Normocephalic  EYES: EOMI, PERRLA, conjunctiva and sclera clear  ENMT: No tonsillar erythema, exudates, or enlargement; Moist mucous membranes, Good dentition, No lesions  NECK: Supple, No JVD, Normal thyroid  NERVOUS SYSTEM:  Alert & Oriented X3, Good concentration; Motor Strength 5/5 B/L upper and lower extremities; DTRs 2+ intact and symmetric  CHEST/LUNG: Clear to percussion bilaterally; No rales, rhonchi, wheezing, or rubs  HEART: Regular rate and rhythm; No murmurs, rubs, or gallops  ABDOMEN: Soft, Nontender, Nondistended; Bowel sounds present,Hepatomegaly  EXTREMITIES:  2+ Peripheral Pulses, No clubbing, cyanosis, or edema  LYMPH: No lymphadenopathy noted  SKIN: No rashes or lesions    Care Discussed with Consultants/Other Providers [x ] YES  [ ] NO      Code Status: [] Full Code [] DNR [] DNI [] Goals of Care:   Disposition: [] ICU [] Stroke Unit [] RCU []PCU []Floor [] Discharge Home         COSMO DaveFACP

## 2018-08-01 NOTE — DISCHARGE NOTE ADULT - CARE PLAN
Principal Discharge DX:	Abdominal pain  Goal:	resolved  Assessment and plan of treatment:	Please follow up at the medical clinic here at [facility].  We emailed the [facility] await call back for medical appointment.  Please follow up at the hepatology clinic.  Await call back for your appointment.  Secondary Diagnosis:	Liver masses  Goal:	remain stable  Assessment and plan of treatment:	Please follow up at the medical clinic here at [facility].  We emailed the [facility] await call back for medical appointment.  Please follow up at the hepatology clinic.  Await call back for your appointment.      -8/1: s/p IR for liver biopsy to evaluate liver lesions f/u results ---------  Please follow up with the oncologist at [facility].  You will receive a phone call with your appointment  Secondary Diagnosis:	Transaminitis  Goal:	remain stable  Assessment and plan of treatment:	Please follow up at the medical clinic here at [facility].  We emailed the [facility] await call back for medical appointment.  Please follow up at the hepatology clinic.  Await call back for your appointment.  Secondary Diagnosis:	Anemia  Goal:	remains stable  Assessment and plan of treatment:	-Microcytic anemia.  - Pt states that she has been in menopause for about 2 years  -iron studies: Iron 39, Unsat Iron Binding Capacity 142.2, Ferritin: 531.4 elevated, TIBC 81, vit b12: 322, folate 14.0  Please follow up at the medical clinic here at [facility].  We emailed the [facility] await call back for medical appointment.  Please follow up at the hepatology clinic.  Await call back for your appointment. Principal Discharge DX:	Abdominal pain  Goal:	resolved  Assessment and plan of treatment:	Please follow up at the medical clinic here at [facility].  We emailed the [facility] await call back for medical appointment. At the medical clinic you will get referral for the hepatology clinic.  Please follow up with your appointment at the medical clinic here at [facility] [phone]  -8/1: s/p IR for liver biopsy to evaluate liver lesions f/u results ---------  Please follow up with the oncologist at [facility].  You will receive a phone call with information regarding your appointment.   You need a referral from the medical clinic.  Please follow up with your appointment at the medical clinic  Secondary Diagnosis:	Liver masses  Goal:	remain stable  Assessment and plan of treatment:	Please follow up at the medical clinic here at [facility].  We emailed the [facility] await call back for medical appointment.  Please await call back from [facility] at [phone] with your appointment.  Please follow up biopsy results. At the medical clinic you will get referral for the hepatology clinic.  Please follow up with your appointment at the medical clinic here at [facility] [phone]  -8/1: s/p IR for liver biopsy to evaluate liver lesions f/u results ---------  Please follow up with the oncologist at [facility].  You will receive a phone call with your appointment  Secondary Diagnosis:	Transaminitis  Goal:	remain stable  Assessment and plan of treatment:	Please follow up at the medical clinic here at [facility].  We emailed the [facility] await call back for medical appointment.  You need a referral from the medical clinic.  Please follow up with your appointment at the medical clinic  Secondary Diagnosis:	Anemia  Goal:	remains stable  Assessment and plan of treatment:	-Microcytic anemia.  - Pt states that she has been in menopause for about 2 years  -iron studies: Iron 39, Unsat Iron Binding Capacity 142.2, Ferritin: 531.4 elevated, TIBC 81, vit b12: 322, folate 14.0  Please follow up at the medical clinic here at [facility].  We emailed the [facility] await call back for medical appointment. Principal Discharge DX:	Abdominal pain  Goal:	resolved  Assessment and plan of treatment:	Please follow up at [facility].  The number was provided to you.  Please call to make an appointment immediately.   At the medical clinic you will get referral for the hepatology clinic.    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  You will receive a phone call with information regarding your appointment.  Secondary Diagnosis:	Liver masses  Goal:	remain stable  Assessment and plan of treatment:	Please follow up at [facility].  The number was provided to you.  Please call to make an appointment immediately.   Please await call back from [facility] [place] [phone]   At the medical clinic you will get referral for the hepatology clinic.   -8/1: s/p IR for liver biopsy to evaluate liver lesions f/u results ---------  Please follow up with the oncologist at [facility] [place].  You will receive a phone call with your appointment  Secondary Diagnosis:	Transaminitis  Goal:	remain stable  Assessment and plan of treatment:	Please follow up at [facility].  The number was provided to you.  Please call to make an appointment immediately.  You need a referral from the medical clinic.  Please follow up with your appointment at the medical clinic  Secondary Diagnosis:	Anemia  Goal:	remains stable  Assessment and plan of treatment:	-Microcytic anemia.  - Pt states that she has been in menopause for about 2 years  -iron studies: Iron 39, Unsat Iron Binding Capacity 142.2, Ferritin: 531.4 elevated, TIBC 81, vit b12: 322, folate 14.0  Please follow up at [facility].  The number was provided to you.  Please call to make an appointment immediately.

## 2018-08-01 NOTE — DISCHARGE NOTE ADULT - CARE PROVIDER_API CALL
[facility],   Phone: [phone]  Fax: (   )    -    [facility],   Phone: [phone]  Fax: (   )    -

## 2018-08-01 NOTE — PROVIDER CONTACT NOTE (OTHER) - ASSESSMENT
Pt A&Ox4 c/o incisional soreness/pain upon sitting up and nausea s/p liver biopsy today. Pt Emily speaking,  services utilized ( [facility] [id number]). ALLYN Wells notified. Pt with one time dose of PO Percocet ordered from 8/1 @ 1000, but denied pain at that time. VS stable at this time documented in flow.

## 2018-08-01 NOTE — CONSULT NOTE ADULT - SUBJECTIVE AND OBJECTIVE BOX
Oncology Consult Note    HPI:  59 yo F visiting from [place] no PMHx p/w RUQ abdominal pain. Pain started about 3 days ago. She describes the pain as dull and constant without radiation. She states overall it has been mild but worse with palpation and her daughter wanted her to be evaluated. She had diarrhea that self resolved a month ago. Otherwise she denies fevers, chills, nausea, vomiting, constipation, weight loss, trauma, association of pain with food or position.  She reports being healthy and has not followed up with a PMD. She has never obtained a colonoscopy, PAP smear or mammogram as part of routine screening. Liver enzymes noted to be elevated on presentation and MRI A/P showing multiple bilobar lesions concerning for malignancy.  Allergies    No Known Allergies    Intolerances        MEDICATIONS  (STANDING):  enoxaparin Injectable 40 milliGRAM(s) SubCutaneous every 24 hours  oxyCODONE    5 mG/acetaminophen 325 mG 1 Tablet(s) Oral once    MEDICATIONS  (PRN):  ondansetron Injectable 4 milliGRAM(s) IV Push every 6 hours PRN Vomiting      PAST MEDICAL & SURGICAL HISTORY:  No pertinent past medical history  No significant past surgical history      FAMILY HISTORY:  Family history of cancer (Sibling): Brother with urinary tract malignancy at asge 65      SOCIAL HISTORY: No EtOH, no tobacco    REVIEW OF SYSTEMS:    CONSTITUTIONAL: No weakness, fevers or chills  EYES/ENT: No visual changes;  No vertigo or throat pain   NECK: No pain or stiffness  RESPIRATORY: No cough, wheezing, hemoptysis; No shortness of breath  CARDIOVASCULAR: No chest pain or palpitations  GASTROINTESTINAL: No abdominal or epigastric pain. No nausea, vomiting, or hematemesis; No diarrhea or constipation. No melena or hematochezia.  GENITOURINARY: No dysuria, frequency or hematuria  NEUROLOGICAL: No numbness or weakness  SKIN: No itching, burning, rashes, or lesions   All other review of systems is negative unless indicated above.    Height (cm): 154.94 (08-01 @ 07:23)  Weight (kg): 62.5 (08-01 @ 07:23)  BMI (kg/m2): 26 (08-01 @ 07:23)  BSA (m2): 1.61 (08-01 @ 07:23)    T(F): 98.9 (08-01-18 @ 17:33), Max: 99.1 (08-01-18 @ 14:10)  HR: 73 (08-01-18 @ 17:33)  BP: 128/63 (08-01-18 @ 17:33)  RR: 18 (08-01-18 @ 17:33)  SpO2: 98% (08-01-18 @ 17:33)  Wt(kg): --    GENERAL: NAD, well-developed  HEAD:  Atraumatic, Normocephalic  EYES: EOMI, PERRLA, conjunctiva and sclera clear  NECK: Supple, No JVD  CHEST/LUNG: Clear to auscultation bilaterally; No wheeze  HEART: Regular rate and rhythm; No murmurs, rubs, or gallops  ABDOMEN: Soft, Nontender, Nondistended; Bowel sounds present  EXTREMITIES:  2+ Peripheral Pulses, No clubbing, cyanosis, or edema  NEUROLOGY: non-focal  SKIN: No rashes or lesions                          9.9    7.56  )-----------( 168      ( 01 Aug 2018 05:46 )             33.8       08-01    142  |  104  |  13  ----------------------------<  75  4.5   |  24  |  0.63    Ca    9.1      01 Aug 2018 05:46    TPro  6.7  /  Alb  3.5  /  TBili  0.8  /  DBili  x   /  AST  86<H>  /  ALT  49<H>  /  AlkPhos  224<H>  08-01
Chief Complaint:  Patient is a 58y old  Female who presents with a chief complaint of RUQ abdominal Pain (27 Jul 2018 14:23)      HPI:  57 yo F from [place], jenni speaking, with no sig PMHx presenting with RUQ abdominal pain.    Pain started about 3 days ago. She describes the pain as dull and constant without radiation. She describes it as feeling a "hardness" in the area. She states overall it has been mild but worse with palpation and her daughter wanted her to be evaluated. She had diarrhea that self resolved a month ago. Otherwise she denies fevers, chills, nausea, vomiting, constipation, weight loss, trauma, association of pain with food or position.  She has had some decreased appetite. She reports being healthy and has not followed up with a PMD. She has never obtained a colonoscopy, PAP smear or mammogram as part of routine screening.     Had a brother with hx of liver disease 2/2 alcohol use but no other family hx of liver problems. She reports that she was told she had liver issues 6-7 years ago and was hospitalized for 5-6 days. She does not know what her diagnosis was after that hospitalization and was never told she had liver issues following that time.    In the ED, vitals stable. Hb of 10. BMP WNL. INR of 1.06. Plts of 200. BMP WNL. T bill of 1.3 alk pos of 179 AST and ALT of 75 and 38. Albumin 3.8. Ferritin 531. US abdomen was done which showed multiple hepatic lesions, larges of 7cm I the left hepatic lobe. MRI abdomen was done showing non cirrhotic liver with numerous bilobar liver lesions and cholelithiasis. A CT chest was done show a few 3mm pulmonary nodules. HIV negative. GI consulted for further management of liver lesions.    Allergies:  No Known Allergies      Home Medications:    Hospital Medications:  enoxaparin Injectable 40 milliGRAM(s) SubCutaneous every 24 hours  ondansetron Injectable 4 milliGRAM(s) IV Push every 6 hours PRN      PMHX/PSHX:  No pertinent past medical history  No significant past surgical history      Family history:  Family history of cancer (Sibling)      Social History:     ROS:     General:  No wt loss, fevers, chills, night sweats, fatigue,   Eyes:  Good vision, no reported pain  ENT:  No sore throat, pain, runny nose, dysphagia  CV:  No pain, palpitations, hypo/hypertension  Resp:  No dyspnea, cough, tachypnea, wheezing  GI:  See HPI  :  No pain, bleeding, incontinence, nocturia  Muscle:  No pain, weakness  Neuro:  No weakness, tingling, memory problems  Psych:  No fatigue, insomnia, mood problems, depression  Endocrine:  No polyuria, polydipsia, cold/heat intolerance  Heme:  No petechiae, ecchymosis, easy bruisability  Skin:  No rash, edema      PHYSICAL EXAM:     GENERAL:  Appears stated age, well-groomed, well-nourished, no distress  HEENT:  NC/AT,  conjunctivae clear and pink,  no JVD  CHEST:  Full & symmetric excursion, no increased effort, breath sounds clear  HEART:  Regular rhythm, S1, S2, no murmur/rub/S3/S4, no abdominal bruit, no edema  ABDOMEN:  Soft, non-tender, non-distended, normoactive bowel sounds,  no masses ,  EXTREMITIES:  no cyanosis,clubbing or edema  SKIN:  No rash/erythema/ecchymoses/petechiae/wounds/abscess/warm/dry  NEURO:  Alert, oriented    Vital Signs:  Vital Signs Last 24 Hrs  T(C): 36.7 (29 Jul 2018 14:37), Max: 37.1 (28 Jul 2018 21:29)  T(F): 98.1 (29 Jul 2018 14:37), Max: 98.7 (28 Jul 2018 21:29)  HR: 74 (29 Jul 2018 14:37) (74 - 91)  BP: 124/82 (29 Jul 2018 14:37) (112/75 - 124/82)  BP(mean): --  RR: 18 (29 Jul 2018 14:37) (18 - 18)  SpO2: 98% (29 Jul 2018 14:37) (98% - 100%)  Daily Height in cm: 154.94 (28 Jul 2018 21:29)    Daily     LABS:                        10.1   5.94  )-----------( 205      ( 29 Jul 2018 06:33 )             33.5     07-29    139  |  101  |  12  ----------------------------<  83  4.0   |  25  |  0.74    Ca    9.1      29 Jul 2018 06:33  Phos  3.8     07-29  Mg     2.3     07-29    TPro  6.9  /  Alb  3.7  /  TBili  1.0  /  DBili  x   /  AST  73<H>  /  ALT  35<H>  /  AlkPhos  187<H>  07-29    LIVER FUNCTIONS - ( 29 Jul 2018 06:33 )  Alb: 3.7 g/dL / Pro: 6.9 g/dL / ALK PHOS: 187 u/L / ALT: 35 u/L / AST: 73 u/L / GGT: x                   Imaging:        < from: MR Abdomen w/ IV Cont (07.27.18 @ 09:48) >    FINDINGS:    LOWER CHEST: Normal.    LIVER: Noncirrhotic. No significant steatosis.   LESIONS: Bilobar T2 hyperintense, hypoenhancing, diffusion restricting   lesions, many confluent with near replacement of the right lobe.   Reference lesions as follows:  --12.1 x 6.8 cm right lobe (series 16, image 22).  --7.9 x 5.1 cm partially exophytic lateral left lobe (series 16, image   18).    VESSELS: Conventional hepatic arterial anatomy. Patent portal veins. Left   hepatic vein is mildly attenuated by a left lobe mass at the dome,   however, remains patent. Patent right and middle hepatic veins.    BILE DUCTS: Normal caliber.  GALLBLADDER: Cholelithiasis.  SPLEEN: Normal.  PANCREAS: Normal.  ADRENALS: 8 mm left adrenal adenoma.  KIDNEYS/URETERS: Subcentimeter bilateral cysts. No hydronephrosis.    VISUALIZED PORTIONS:    BOWEL: Within normal limits.   PERITONEUM: Trace perihepatic ascites.  RETROPERITONEUM: Prominent subcentimeter retroperitoneal and periportal   lymph nodes with areference portacaval node measuring 0.8 cm in short   axis (series 15, image 69).  ABDOMINAL WALL: Within normal limits.  BONES: Multilevel degenerative change.    IMPRESSION:     1.  Noncirrhotic liver with numerous bilobar liver lesions, suspicious  for diffuse tumor. Primary consideration is metastatic disease, though   primary liver malignancy cannot be entirely excluded. Patent portal and   hepatic veins.  2.  Cholelithiasis.    < end of copied text >

## 2018-08-01 NOTE — DISCHARGE NOTE ADULT - PLAN OF CARE
resolved Please follow up at the medical clinic here at [facility].  We emailed the [facility] clinic await call back for medical appointment.  Please follow up at the hepatology clinic.  Await call back for your appointment. remain stable Please follow up at the medical clinic here at [facility].  We emailed the [facility] clinic await call back for medical appointment.  Please follow up at the hepatology clinic.  Await call back for your appointment.      -8/1: s/p IR for liver biopsy to evaluate liver lesions f/u results ---------  Please follow up with the oncologist at [facility].  You will receive a phone call with your appointment Please follow up at the medical clinic here at [facility].  We emailed the [facility] clinic await call back for medical appointment.  Please follow up at the hepatology clinic.  Await call back for your appointment. remains stable -Microcytic anemia.  - Pt states that she has been in menopause for about 2 years  -iron studies: Iron 39, Unsat Iron Binding Capacity 142.2, Ferritin: 531.4 elevated, TIBC 81, vit b12: 322, folate 14.0  Please follow up at the medical clinic here at [facility].  We emailed the [facility] clinic await call back for medical appointment.  Please follow up at the hepatology clinic.  Await call back for your appointment. Please follow up at the medical clinic here at [facility].  We emailed the [facility] await call back for medical appointment. At the medical clinic you will get referral for the hepatology clinic.  Please follow up with your appointment at the medical clinic here at [facility] [phone]  -8/1: s/p IR for liver biopsy to evaluate liver lesions f/u results ---------  Please follow up with the oncologist at [facility].  You will receive a phone call with information regarding your appointment.   You need a referral from the medical clinic.  Please follow up with your appointment at the medical clinic Please follow up at the medical clinic here at [facility].  We emailed the [facility] clinic await call back for medical appointment.  Please await call back from [facility] at [phone] with your appointment.  Please follow up biopsy results. At the medical clinic you will get referral for the hepatology clinic.  Please follow up with your appointment at the medical clinic here at [facility] [phone]  -8/1: s/p IR for liver biopsy to evaluate liver lesions f/u results ---------  Please follow up with the oncologist at [facility].  You will receive a phone call with your appointment Please follow up at the medical clinic here at [facility].  We emailed the [facility] clinic await call back for medical appointment.  You need a referral from the medical clinic.  Please follow up with your appointment at the medical clinic -Microcytic anemia.  - Pt states that she has been in menopause for about 2 years  -iron studies: Iron 39, Unsat Iron Binding Capacity 142.2, Ferritin: 531.4 elevated, TIBC 81, vit b12: 322, folate 14.0  Please follow up at the medical clinic here at [facility].  We emailed the [facility] clinic await call back for medical appointment. Please follow up at [facility].  The number was provided to you.  Please call to make an appointment immediately.   At the medical clinic you will get referral for the hepatology clinic.    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  You will receive a phone call with information regarding your appointment. Please follow up at [facility].  The number was provided to you.  Please call to make an appointment immediately.   Please await call back from [facility] [phone]   At the medical clinic you will get referral for the hepatology clinic.   -8/1: s/p IR for liver biopsy to evaluate liver lesions f/u results ---------  Please follow up with the oncologist at [facility].  You will receive a phone call with your appointment Please follow up at [facility].  The number was provided to you.  Please call to make an appointment immediately.  You need a referral from the medical clinic.  Please follow up with your appointment at the medical clinic -Microcytic anemia.  - Pt states that she has been in menopause for about 2 years  -iron studies: Iron 39, Unsat Iron Binding Capacity 142.2, Ferritin: 531.4 elevated, TIBC 81, vit b12: 322, folate 14.0  Please follow up at [facility].  The number was provided to you.  Please call to make an appointment immediately.

## 2018-08-01 NOTE — PROGRESS NOTE ADULT - SUBJECTIVE AND OBJECTIVE BOX
CESAR SIMS  58y  Female      Patient is a 58y old  Female who presents with a chief complaint of RUQ abdominal Pain (01 Aug 2018 16:51)  s/p LIVER BIOPSY BY IR,comfortable,nad,no abd.pain,no sob,no cp,no fever    REVIEW OF SYSTEMS:  as above    INTERVAL HPI/OVERNIGHT EVENTS:  T(C): 37.2 (08-01-18 @ 17:33), Max: 37.3 (08-01-18 @ 14:10)  HR: 73 (08-01-18 @ 17:33) (72 - 85)  BP: 128/63 (08-01-18 @ 17:33) (100/81 - 128/63)  RR: 18 (08-01-18 @ 17:33) (17 - 18)  SpO2: 98% (08-01-18 @ 17:33) (98% - 100%)  Wt(kg): --  I&O's Summary    T(C): 37.2 (08-01-18 @ 17:33), Max: 37.3 (08-01-18 @ 14:10)  HR: 73 (08-01-18 @ 17:33) (72 - 85)  BP: 128/63 (08-01-18 @ 17:33) (100/81 - 128/63)  RR: 18 (08-01-18 @ 17:33) (17 - 18)  SpO2: 98% (08-01-18 @ 17:33) (98% - 100%)  Wt(kg): --Vital Signs Last 24 Hrs  T(C): 37.2 (01 Aug 2018 17:33), Max: 37.3 (01 Aug 2018 14:10)  T(F): 98.9 (01 Aug 2018 17:33), Max: 99.1 (01 Aug 2018 14:10)  HR: 73 (01 Aug 2018 17:33) (72 - 85)  BP: 128/63 (01 Aug 2018 17:33) (100/81 - 128/63)  BP(mean): --  RR: 18 (01 Aug 2018 17:33) (17 - 18)  SpO2: 98% (01 Aug 2018 17:33) (98% - 100%)    LABS:                        9.9    7.56  )-----------( 168      ( 01 Aug 2018 05:46 )             33.8     08-01    142  |  104  |  13  ----------------------------<  75  4.5   |  24  |  0.63    Ca    9.1      01 Aug 2018 05:46    TPro  6.7  /  Alb  3.5  /  TBili  0.8  /  DBili  x   /  AST  86<H>  /  ALT  49<H>  /  AlkPhos  224<H>  08-01    PT/INR - ( 01 Aug 2018 05:46 )   PT: 11.6 SEC;   INR: 1.04          PTT - ( 01 Aug 2018 05:46 )  PTT:29.2 SEC    CAPILLARY BLOOD GLUCOSE                PAST MEDICAL & SURGICAL HISTORY:  No pertinent past medical history  No significant past surgical history      MEDICATIONS  (STANDING):  enoxaparin Injectable 40 milliGRAM(s) SubCutaneous every 24 hours    MEDICATIONS  (PRN):  ondansetron Injectable 4 milliGRAM(s) IV Push every 6 hours PRN Vomiting        RADIOLOGY & ADDITIONAL TESTS:    Imaging Personally Reviewed:  [ ] YES  [ ] NO    Consultant(s) Notes Reviewed:  [ ] YES  [ ] NO    PHYSICAL EXAM:  GENERAL: NAD, well-groomed, well-developed  HEAD:  Atraumatic, Normocephalic  EYES: EOMI, PERRLA, conjunctiva and sclera clear  ENMT: No tonsillar erythema, exudates, or enlargement; Moist mucous membranes, Good dentition, No lesions  NECK: Supple, No JVD, Normal thyroid  NERVOUS SYSTEM:  Alert & Oriented X3, Good concentration; Motor Strength 5/5 B/L upper and lower extremities; DTRs 2+ intact and symmetric  CHEST/LUNG: Clear to percussion bilaterally; No rales, rhonchi, wheezing, or rubs  HEART: Regular rate and rhythm; No murmurs, rubs, or gallops  ABDOMEN: Soft, Nontender, Nondistended; Bowel sounds present,Hepatomegaly  EXTREMITIES:  2+ Peripheral Pulses, No clubbing, cyanosis, or edema  LYMPH: No lymphadenopathy noted  SKIN: No rashes or lesions    Care Discussed with Consultants/Other Providers [x ] YES  [ ] NO      Code Status: [] Full Code [] DNR [] DNI [] Goals of Care:   Disposition: [] ICU [] Stroke Unit [] RCU []PCU []Floor [] Discharge Home         OMA DaveP

## 2018-08-01 NOTE — CONSULT NOTE ADULT - ASSESSMENT
58 F originally from [place] with no known medical history who presented for acute abdominal pain and found to have multiple liver lesions on imaging concerning for malignancy:    - MRI abdomen showing multiple liver lesions, some of which are quite large. Differential of primary HCC vs metastatic solid tumor malignancy  - s/p IR-guided biopsy of liver 8/1  - await pathology results for confirmation  - CT chest negative for apparent malignancy, a few small pulmonary nodules are non-specific  - tumor markers all elevated, AFP markedly so  - patient and family wish to pursue care in the [place], [place] [facility] [place] unsure if she can be seen at our facility given she is not a resident of [place]. Regardless will try.

## 2018-08-01 NOTE — DISCHARGE NOTE ADULT - PROVIDER TOKENS
FREE:[LAST:[[facility]],PHONE:[phone]],FAX:[(   )    -]],FREE:[LAST:[[facility]],PHONE:[phone]:[(   )    -]]

## 2018-08-01 NOTE — DISCHARGE NOTE ADULT - PATIENT PORTAL LINK FT
You can access the [facility], offered by [facility], by registering with the following website: [email or web address]

## 2018-08-01 NOTE — DISCHARGE NOTE ADULT - HOSPITAL COURSE
59 yo F visiting from [place] no PMHx p/w RUQ abdominal pain  found to have transaminitis, with numerous bilobar liver lesions seen on MRI, suspicious for diffuse tumor      Abdominal pain  -She reports it as mild and increased in intensity mostly with palpation.   -Likely 2/2 underlining liver lesions. + Chololithiasis noted. However, symptoms not c/w biliary colic.   -serial abdominal exams  -pt not currently requiring pain medication.   -PRN zofran if devolops nausea.   Please follow up at the medical clinic here at [facility].  We emailed the [facility] await call back for medical appointment.  Please follow up at the hepatology clinic.  Await call back for your appointment.    Liver masses.    -MRI Abd- 1.  Noncirrhotic liver with numerous bilobar liver lesions, suspicious for diffuse tumor. Primary consideration is metastatic disease, though primary liver malignancy cannot be entirely excluded. Patent portal and hepatic veins.  2.  Cholelithiasis  - In the setting of multiple lesions, and microcytic anemia in a menopausal female w/o respiratory symptoms suspect primary in GI tract vs primary liver (less likely).  -CT chest:A few 3 mm pulmonary nodules are nonspecific.  Extensive infiltrative hepatic disease as at recent prior imaging.  - GI following  -would recommend IR for liver biopsy to evaluate liver lesions f/u results ------------------  -hepatitis serologies (hepatitis B): neg  -8/1: s/p IR for liver biopsy to evaluate liver lesions----------  -hepatitis serologies (hepatitis B): neg  -trend LFTs daily  CA 19-9: 74.0 elevated , AFP3,281.0 elevated , CEA 5.8 elevated  -Oncology Consulted awaiting recs------------------------------  -trend LFTs daily  Please follow up at the medical clinic here at [facility].  We emailed the [facility] await call back for medical appointment.  Please follow up at the hepatology clinic.  Await call back for your appointment.  Please follow up with oncology at [facility]    Transaminitis.    -Mild  -Pattern c/w hepatocellular etiology likely in setting of multiple liver lesions   -US of Abd:Bilobar infiltrative hepatic disease. Contrast-enhanced MRI is   recommended for further evaluation.  Cholelithiasis. No sonographic evidence of cholecystitis.  -monitor LFTs  -HIV neg  -Hepatitis panel: neg   Please follow up at the medical clinic here at [facility].  We emailed the [facility] clinic await call back for medical appointment.  Please follow up at the hepatology clinic.  Await call back for your appointment.    Anemia.    -Microcytic anemia.  - Pt states that she has been in menopause for about 2 years. No prior malignancy screening. No baseline to compare  -iron studies: Iron 39, Unsat Iron Binding Capacity 142.2, Ferritin: 531.4 elevated, TIBC 81, vit b12: 322, folate 14.0  -malignancy w/u as stated above.     Dispo: Home 57 yo F visiting from [place] no PMHx p/w RUQ abdominal pain  found to have transaminitis, with numerous bilobar liver lesions seen on MRI, suspicious for diffuse tumor      Abdominal pain  -She reports it as mild and increased in intensity mostly with palpation.   -Likely 2/2 underlining liver lesions. + Chololithiasis noted. However, symptoms not c/w biliary colic.   -serial abdominal exams  -pt not currently requiring pain medication.   -PRN zofran if devolops nausea.   Please follow up at the medical clinic here at [facility].  We emailed the [facility] await call back for medical appointment.   You need a referral from the medical clinic.  Please follow up with your appointment at the medical clinic     Liver masses.    -MRI Abd- 1.  Noncirrhotic liver with numerous bilobar liver lesions, suspicious for diffuse tumor. Primary consideration is metastatic disease, though primary liver malignancy cannot be entirely excluded. Patent portal and hepatic veins.  2.  Cholelithiasis  - In the setting of multiple lesions, and microcytic anemia in a menopausal female w/o respiratory symptoms suspect primary in GI tract vs primary liver (less likely).  -CT chest:A few 3 mm pulmonary nodules are nonspecific.  Extensive infiltrative hepatic disease as at recent prior imaging.  - GI following  -hepatitis serologies (hepatitis B): neg  -8/1: s/p IR for liver biopsy to evaluate liver lesions----------  -hepatitis serologies (hepatitis B): neg  -trend LFTs daily  CA 19-9: 74.0 elevated , AFP3,281.0 elevated , CEA 5.8 elevated  -Oncology Consulted  -trend LFTs daily  Please follow up at the medical clinic here at [facility].  We emailed the [facility] await call back for medical appointment.  You need a referral from the medical clinic.  Please follow up with your appointment at the medical clinic    Please follow up with oncology at [facility] you will receive a phone call with information regarding your appointment.      Transaminitis.    -Mild  -Pattern c/w hepatocellular etiology likely in setting of multiple liver lesions   -US of Abd:Bilobar infiltrative hepatic disease. Contrast-enhanced MRI is   recommended for further evaluation.  Cholelithiasis. No sonographic evidence of cholecystitis.  -monitor LFTs  -HIV neg  -Hepatitis panel: neg   Please follow up at the medical clinic here at [facility].  We emailed the [facility] clinic await call back for medical appointment.  Please follow up at the hepatology clinic.  Await call back for your appointment.    Anemia.    -Microcytic anemia.  - Pt states that she has been in menopause for about 2 years. No prior malignancy screening. No baseline to compare  -iron studies: Iron 39, Unsat Iron Binding Capacity 142.2, Ferritin: 531.4 elevated, TIBC 81, vit b12: 322, folate 14.0  -malignancy w/u as stated above.     Dispo: Home 57 yo F visiting from [place] no PMHx p/w RUQ abdominal pain  found to have transaminitis, with numerous bilobar liver lesions seen on MRI, suspicious for diffuse tumor      Abdominal pain  -She reports it as mild and increased in intensity mostly with palpation.   -Likely 2/2 underlining liver lesions. + Chololithiasis noted. However, symptoms not c/w biliary colic.   -serial abdominal exams  -pt not currently requiring pain medication.   -PRN zofran if devolops nausea.    Please follow up at [facility].  The number was provided to you.  Please call to make an appointment immediately. You need a referral from the medical clinic for a hepatology appointment.    Please follow up with your appointment at the medical clinic     Liver masses.    -MRI Abd- 1.  Noncirrhotic liver with numerous bilobar liver lesions, suspicious for diffuse tumor. Primary consideration is metastatic disease, though primary liver malignancy cannot be entirely excluded. Patent portal and hepatic veins.  2.  Cholelithiasis  - In the setting of multiple lesions, and microcytic anemia in a menopausal female w/o respiratory symptoms suspect primary in GI tract vs primary liver (less likely).  -CT chest:A few 3 mm pulmonary nodules are nonspecific.  Extensive infiltrative hepatic disease as at recent prior imaging.  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.  The number was provided to you.  Please call to make an appointment immediately. You need a hepatology referral from the medical clinic.    Please follow up with your appointment at the medical clinic    Please follow up with oncology at [facility] you will receive a phone call with information regarding your appointment.      Transaminitis.    -Mild  -Pattern c/w hepatocellular etiology likely in setting of multiple liver lesions   -US of Abd:Bilobar infiltrative hepatic disease. Contrast-enhanced MRI is   recommended for further evaluation.  Cholelithiasis. No sonographic evidence of cholecystitis.  -monitor LFTs  -HIV neg  -Hepatitis panel: neg   Please follow up at [facility].  The number was provided to you.  Please call to make an appointment immediately.   You need a hepatology referral from the medical clinic.    Please follow up with your appointment at the medical clinic    Please follow up with oncology at [facility] you will receive a phone call with information regarding your appointment.      Anemia.    -Microcytic anemia.  - Pt states that she has been in menopause for about 2 years. No prior malignancy screening. No baseline to compare  -iron studies: Iron 39, Unsat Iron Binding Capacity 142.2, Ferritin: 531.4 elevated, TIBC 81, vit b12: 322, folate 14.0  -malignancy w/u as stated above.     Dispo: Home

## 2018-08-01 NOTE — CONSULT NOTE ADULT - ATTENDING COMMENTS
newly found to have multiple lesions in the liver concerning for malignancy, will follow up histology result and discharge for follow up at [facility].

## 2018-08-01 NOTE — PROVIDER CONTACT NOTE (OTHER) - RECOMMENDATIONS
As per ADS Priscilla, okay to give PO Percocet as ordered and PRN IVP Zofran for nausea. Continuing to monitor.

## 2018-08-01 NOTE — CHART NOTE - NSCHARTNOTEFT_GEN_A_CORE
Pre-Interventional Radiology Procedure Note    58y    Female    Procedure: Liver biopsy    Diagnosis/Indication: Patient is a 58y old  Female who presents with a chief complaint of RUQ abdominal Pain (27 Jul 2018 14:23)      Interventional Radiology Attending Physician: Dr. Handley    Ordering Attending Physician: Dr. Dave    PAST MEDICAL & SURGICAL HISTORY:  No pertinent past medical history  No significant past surgical history       CBC Full  -  ( 01 Aug 2018 05:46 )  WBC Count : 7.56 K/uL  Hemoglobin : 9.9 g/dL  Hematocrit : 33.8 %  Platelet Count - Automated : 168 K/uL  Mean Cell Volume : 62.9 fL  Mean Cell Hemoglobin : 18.4 pg  Mean Cell Hemoglobin Concentration : 29.3 %  Auto Neutrophil # : x  Auto Lymphocyte # : x  Auto Monocyte # : x  Auto Eosinophil # : x  Auto Basophil # : x  Auto Neutrophil % : x  Auto Lymphocyte % : x  Auto Monocyte % : x  Auto Eosinophil % : x  Auto Basophil % : x    08-01    142  |  104  |  13  ----------------------------<  75  4.5   |  24  |  0.63    Ca    9.1      01 Aug 2018 05:46    TPro  6.7  /  Alb  3.5  /  TBili  0.8  /  DBili  x   /  AST  86<H>  /  ALT  49<H>  /  AlkPhos  224<H>  08-01    PT/INR - ( 01 Aug 2018 05:46 )   PT: 11.6 SEC;   INR: 1.04          PTT - ( 01 Aug 2018 05:46 )  PTT:29.2 SEC

## 2018-08-02 NOTE — PROGRESS NOTE ADULT - ASSESSMENT
CESAR SIMS  58y  Female      Patient is a 58y old  Female who presents with a chief complaint of RUQ abdominal Pain (01 Aug 2018 16:51)  Patient is comfortable,nad,no cp,no sob,no abd.pain,no fever.s/pLiver biopsy by IR.    REVIEW OF SYSTEMS:  as above      INTERVAL HPI/OVERNIGHT EVENTS:  T(C): 36.7 (08-02-18 @ 13:51), Max: 36.7 (08-01-18 @ 21:50)  HR: 81 (08-02-18 @ 13:51) (70 - 81)  BP: 118/80 (08-02-18 @ 13:51) (106/67 - 121/79)  RR: 17 (08-02-18 @ 13:51) (17 - 18)  SpO2: 99% (08-02-18 @ 13:51) (99% - 99%)  Wt(kg): --  I&O's Summary    T(C): 36.7 (08-02-18 @ 13:51), Max: 36.7 (08-01-18 @ 21:50)  HR: 81 (08-02-18 @ 13:51) (70 - 81)  BP: 118/80 (08-02-18 @ 13:51) (106/67 - 121/79)  RR: 17 (08-02-18 @ 13:51) (17 - 18)  SpO2: 99% (08-02-18 @ 13:51) (99% - 99%)  Wt(kg): --Vital Signs Last 24 Hrs  T(C): 36.7 (02 Aug 2018 13:51), Max: 36.7 (01 Aug 2018 21:50)  T(F): 98.1 (02 Aug 2018 13:51), Max: 98.1 (01 Aug 2018 21:50)  HR: 81 (02 Aug 2018 13:51) (70 - 81)  BP: 118/80 (02 Aug 2018 13:51) (106/67 - 121/79)  BP(mean): --  RR: 17 (02 Aug 2018 13:51) (17 - 18)  SpO2: 99% (02 Aug 2018 13:51) (99% - 99%)    LABS:                        10.7   7.71  )-----------( 215      ( 02 Aug 2018 05:52 )             36.7     08-02    138  |  98  |  14  ----------------------------<  82  4.7   |  25  |  0.73    Ca    9.4      02 Aug 2018 05:52    TPro  6.7  /  Alb  3.5  /  TBili  0.8  /  DBili  x   /  AST  86<H>  /  ALT  49<H>  /  AlkPhos  224<H>  08-01    PT/INR - ( 01 Aug 2018 05:46 )   PT: 11.6 SEC;   INR: 1.04          PTT - ( 01 Aug 2018 05:46 )  PTT:29.2 SEC    CAPILLARY BLOOD GLUCOSE                PAST MEDICAL & SURGICAL HISTORY:  No pertinent past medical history  No significant past surgical history      MEDICATIONS  (STANDING):  enoxaparin Injectable 40 milliGRAM(s) SubCutaneous every 24 hours    MEDICATIONS  (PRN):  ondansetron Injectable 4 milliGRAM(s) IV Push every 6 hours PRN Vomiting        RADIOLOGY & ADDITIONAL TESTS:    Imaging Personally Reviewed:  [ ] YES  [ ] NO    Consultant(s) Notes Reviewed:  [ ] YES  [ ] NO    PHYSICAL EXAM:  GENERAL: NAD, well-groomed, well-developed  HEAD:  Atraumatic, Normocephalic  EYES: EOMI, PERRLA, conjunctiva and sclera clear  ENMT: No tonsillar erythema, exudates, or enlargement; Moist mucous membranes, Good dentition, No lesions  NECK: Supple, No JVD, Normal thyroid  NERVOUS SYSTEM:  Alert & Oriented X3, Good concentration; Motor Strength 5/5 B/L upper and lower extremities; DTRs 2+ intact and symmetric  CHEST/LUNG: Clear to percussion bilaterally; No rales, rhonchi, wheezing, or rubs  HEART: Regular rate and rhythm; No murmurs, rubs, or gallops  ABDOMEN: Soft, Nontender, Nondistended; Bowel sounds present,Hepatomegaly  EXTREMITIES:  2+ Peripheral Pulses, No clubbing, cyanosis, or edema  LYMPH: No lymphadenopathy noted  SKIN: No rashes or lesions    Care Discussed with Consultants/Other Providers [ ] YES  [ ] NO      Code Status: [] Full Code [] DNR [] DNI [] Goals of Care:   Disposition: [] ICU [] Stroke Unit [] RCU []PCU []Floor [] Discharge Home         COSMO DaveFACP

## 2018-08-03 NOTE — DIETITIAN INITIAL EVALUATION ADULT. - NS AS NUTRI INTERV MEALS SNACK
1. Suggest: PO diet rx: Regular, Low Sodium; PO supplement: Ensure Enlive 8oz. 1x daily (will provide additional ~350 Kcal, ~ 20 gm Protein);             2. Encourage & assist Pt with meals; Monitor PO diet tolerance; Honor food preferences;               3. Monitor labs, weights, hydration status;/Diets modified for specific foods and ingredients/Other (specify)

## 2018-08-03 NOTE — PROGRESS NOTE ADULT - SUBJECTIVE AND OBJECTIVE BOX
CESAR SIMS  58y  Female      Patient is a 58y old  Female who presents with a chief complaint of RUQ abdominal Pain (01 Aug 2018 16:51)  Patient feels ok.no new c/o.no abd.pain,no cp,nosob    REVIEW OF SYSTEMS:  as above      INTERVAL HPI/OVERNIGHT EVENTS:  T(C): 37.2 (08-03-18 @ 13:40), Max: 37.2 (08-03-18 @ 13:40)  HR: 84 (08-03-18 @ 13:40) (72 - 84)  BP: 118/80 (08-03-18 @ 13:40) (104/67 - 118/80)  RR: 17 (08-03-18 @ 13:40) (17 - 18)  SpO2: 98% (08-03-18 @ 13:40) (98% - 99%)  Wt(kg): --  I&O's Summary    T(C): 37.2 (08-03-18 @ 13:40), Max: 37.2 (08-03-18 @ 13:40)  HR: 84 (08-03-18 @ 13:40) (72 - 84)  BP: 118/80 (08-03-18 @ 13:40) (104/67 - 118/80)  RR: 17 (08-03-18 @ 13:40) (17 - 18)  SpO2: 98% (08-03-18 @ 13:40) (98% - 99%)  Wt(kg): --Vital Signs Last 24 Hrs  T(C): 37.2 (03 Aug 2018 13:40), Max: 37.2 (03 Aug 2018 13:40)  T(F): 98.9 (03 Aug 2018 13:40), Max: 98.9 (03 Aug 2018 13:40)  HR: 84 (03 Aug 2018 13:40) (72 - 84)  BP: 118/80 (03 Aug 2018 13:40) (104/67 - 118/80)  BP(mean): --  RR: 17 (03 Aug 2018 13:40) (17 - 18)  SpO2: 98% (03 Aug 2018 13:40) (98% - 99%)    LABS:                        10.7   7.71  )-----------( 215      ( 02 Aug 2018 05:52 )             36.7     08-02    138  |  98  |  14  ----------------------------<  82  4.7   |  25  |  0.73    Ca    9.4      02 Aug 2018 05:52          CAPILLARY BLOOD GLUCOSE                PAST MEDICAL & SURGICAL HISTORY:  No pertinent past medical history  No significant past surgical history      MEDICATIONS  (STANDING):  enoxaparin Injectable 40 milliGRAM(s) SubCutaneous every 24 hours    MEDICATIONS  (PRN):  ondansetron Injectable 4 milliGRAM(s) IV Push every 6 hours PRN Vomiting        RADIOLOGY & ADDITIONAL TESTS:    Imaging Personally Reviewed:  [ ] YES  [ ] NO    Consultant(s) Notes Reviewed:  [ ] YES  [ ] NO    PHYSICAL EXAM:  GENERAL: NAD, well-groomed, well-developed  HEAD:  Atraumatic, Normocephalic  EYES: EOMI, PERRLA, conjunctiva and sclera clear  ENMT: No tonsillar erythema, exudates, or enlargement; Moist mucous membranes, Good dentition, No lesions  NECK: Supple, No JVD, Normal thyroid  NERVOUS SYSTEM:  Alert & Oriented X3, Good concentration; Motor Strength 5/5 B/L upper and lower extremities; DTRs 2+ intact and symmetric  CHEST/LUNG: Clear to percussion bilaterally; No rales, rhonchi, wheezing, or rubs  HEART: Regular rate and rhythm; No murmurs, rubs, or gallops  ABDOMEN: Soft, Nontender, Nondistended; Bowel sounds present.Hepatomegaly  EXTREMITIES:  2+ Peripheral Pulses, No clubbing, cyanosis, or edema  LYMPH: No lymphadenopathy noted  SKIN: No rashes or lesions    Care Discussed with Consultants/Other Providers [ ] YES  [ ] NO      Code Status: [] Full Code [] DNR [] DNI [] Goals of Care:   Disposition: [] ICU [] Stroke Unit [] RCU []PCU []Floor [] Discharge Home         SHILO Dave

## 2018-08-03 NOTE — DIETITIAN INITIAL EVALUATION ADULT. - SOURCE
Pt speaks Emily; RDN spoke to Pt's family over the phone (Leroy & Pt's daughter: [phone]); Nurse, Medical Chart/patient/family/significant other/other (specify)

## 2018-08-03 NOTE — DIETITIAN INITIAL EVALUATION ADULT. - OTHER INFO
Pt seen for Length of stay. Pt 57 yo female from [place] with Liver Mass/Liver lesion; s/p Liver biopsy. Pt appears alert, oriented. Pt speaks Emily. RDN spoke to Pt's family over the phone (Leroy & Pt's daughter: [phone]). Per family Pt's appetite not that well lately; Pt lost weight (PTA), but family unable to quantify. Pt usually eats Regular food reported. No report of chewing/swallowing difficulties; no report of nausea, vomiting or diarrhea @ this time. No food related concerns voiced @ present. RDN remains available, Pt made aware.

## 2018-08-04 NOTE — PROGRESS NOTE ADULT - SUBJECTIVE AND OBJECTIVE BOX
CESAR SIMS  58y  Female      Patient is a 58y old  Female who presents with a chief complaint of RUQ abdominal Pain (01 Aug 2018 16:51)  Patient is comfortable,nad,no abd.pain,no n/v    REVIEW OF SYSTEMS:  neg      INTERVAL HPI/OVERNIGHT EVENTS:  T(C): 36.9 (08-04-18 @ 12:00), Max: 36.9 (08-04-18 @ 12:00)  HR: 76 (08-04-18 @ 12:00) (72 - 89)  BP: 115/64 (08-04-18 @ 12:00) (103/63 - 131/90)  RR: 18 (08-04-18 @ 12:00) (17 - 18)  SpO2: 100% (08-04-18 @ 12:00) (99% - 100%)  Wt(kg): --  I&O's Summary    T(C): 36.9 (08-04-18 @ 12:00), Max: 36.9 (08-04-18 @ 12:00)  HR: 76 (08-04-18 @ 12:00) (72 - 89)  BP: 115/64 (08-04-18 @ 12:00) (103/63 - 131/90)  RR: 18 (08-04-18 @ 12:00) (17 - 18)  SpO2: 100% (08-04-18 @ 12:00) (99% - 100%)  Wt(kg): --Vital Signs Last 24 Hrs  T(C): 36.9 (04 Aug 2018 12:00), Max: 36.9 (04 Aug 2018 12:00)  T(F): 98.4 (04 Aug 2018 12:00), Max: 98.4 (04 Aug 2018 12:00)  HR: 76 (04 Aug 2018 12:00) (72 - 89)  BP: 115/64 (04 Aug 2018 12:00) (103/63 - 131/90)  BP(mean): --  RR: 18 (04 Aug 2018 12:00) (17 - 18)  SpO2: 100% (04 Aug 2018 12:00) (99% - 100%)    LABS:        TPro  6.9  /  Alb  3.6  /  TBili  0.9  /  DBili  0.3<H>  /  AST  107<H>  /  ALT  58<H>  /  AlkPhos  275<H>  08-04        CAPILLARY BLOOD GLUCOSE                PAST MEDICAL & SURGICAL HISTORY:  No pertinent past medical history  No significant past surgical history      MEDICATIONS  (STANDING):  enoxaparin Injectable 40 milliGRAM(s) SubCutaneous every 24 hours    MEDICATIONS  (PRN):  ondansetron Injectable 4 milliGRAM(s) IV Push every 6 hours PRN Vomiting        RADIOLOGY & ADDITIONAL TESTS:    Imaging Personally Reviewed:  [ ] YES  [ ] NO    Consultant(s) Notes Reviewed:  [ ] YES  [ ] NO    PHYSICAL EXAM:  GENERAL: NAD, well-groomed, well-developed  HEAD:  Atraumatic, Normocephalic  EYES: EOMI, PERRLA, conjunctiva and sclera clear  ENMT: No tonsillar erythema, exudates, or enlargement; Moist mucous membranes, Good dentition, No lesions  NECK: Supple, No JVD, Normal thyroid  NERVOUS SYSTEM:  Alert & Oriented X3, Good concentration; Motor Strength 5/5 B/L upper and lower extremities; DTRs 2+ intact and symmetric  CHEST/LUNG: Clear to percussion bilaterally; No rales, rhonchi, wheezing, or rubs  HEART: Regular rate and rhythm; No murmurs, rubs, or gallops  ABDOMEN: Soft, Nontender, Nondistended; Bowel sounds present  EXTREMITIES:  2+ Peripheral Pulses, No clubbing, cyanosis, or edema  LYMPH: No lymphadenopathy noted  SKIN: No rashes or lesions    Care Discussed with Consultants/Other Providers [ ] YES  [ ] NO      Code Status: [] Full Code [] DNR [] DNI [] Goals of Care:   Disposition: [] ICU [] Stroke Unit [] RCU []PCU []Floor [] Discharge Home         SHILO Dave

## 2018-08-05 NOTE — PROGRESS NOTE ADULT - SUBJECTIVE AND OBJECTIVE BOX
CESAR SIMS  58y  Female      Patient is a 58y old  Female who presents with a chief complaint of RUQ abdominal Pain (01 Aug 2018 16:51)  Patient is comfortable,no sob,no cp,no fever,mild abd.discomfort RUQ area.no n/v    REVIEW OF SYSTEMS:  as above        INTERVAL HPI/OVERNIGHT EVENTS:  T(C): 36.9 (08-05-18 @ 13:45), Max: 36.9 (08-04-18 @ 21:47)  HR: 80 (08-05-18 @ 13:45) (59 - 84)  BP: 113/71 (08-05-18 @ 13:45) (113/71 - 118/73)  RR: 18 (08-05-18 @ 13:45) (17 - 18)  SpO2: 98% (08-05-18 @ 13:45) (98% - 100%)  Wt(kg): --  I&O's Summary    T(C): 36.9 (08-05-18 @ 13:45), Max: 36.9 (08-04-18 @ 21:47)  HR: 80 (08-05-18 @ 13:45) (59 - 84)  BP: 113/71 (08-05-18 @ 13:45) (113/71 - 118/73)  RR: 18 (08-05-18 @ 13:45) (17 - 18)  SpO2: 98% (08-05-18 @ 13:45) (98% - 100%)  Wt(kg): --Vital Signs Last 24 Hrs  T(C): 36.9 (05 Aug 2018 13:45), Max: 36.9 (04 Aug 2018 21:47)  T(F): 98.5 (05 Aug 2018 13:45), Max: 98.5 (05 Aug 2018 13:45)  HR: 80 (05 Aug 2018 13:45) (59 - 84)  BP: 113/71 (05 Aug 2018 13:45) (113/71 - 118/73)  BP(mean): --  RR: 18 (05 Aug 2018 13:45) (17 - 18)  SpO2: 98% (05 Aug 2018 13:45) (98% - 100%)    LABS:        TPro  7.2  /  Alb  3.9  /  TBili  1.1  /  DBili  0.3<H>  /  AST  114<H>  /  ALT  63<H>  /  AlkPhos  297<H>  08-05        CAPILLARY BLOOD GLUCOSE                PAST MEDICAL & SURGICAL HISTORY:  No pertinent past medical history  No significant past surgical history      MEDICATIONS  (STANDING):  enoxaparin Injectable 40 milliGRAM(s) SubCutaneous every 24 hours    MEDICATIONS  (PRN):  ondansetron Injectable 4 milliGRAM(s) IV Push every 6 hours PRN Vomiting        RADIOLOGY & ADDITIONAL TESTS:    Imaging Personally Reviewed:  [ ] YES  [ ] NO    Consultant(s) Notes Reviewed:  [ ] YES  [ ] NO    PHYSICAL EXAM:  GENERAL: NAD, well-groomed, well-developed  HEAD:  Atraumatic, Normocephalic  EYES: EOMI, PERRLA, conjunctiva and sclera clear  ENMT: No tonsillar erythema, exudates, or enlargement; Moist mucous membranes, Good dentition, No lesions  NECK: Supple, No JVD, Normal thyroid  NERVOUS SYSTEM:  Alert & Oriented X3, Good concentration; Motor Strength 5/5 B/L upper and lower extremities; DTRs 2+ intact and symmetric  CHEST/LUNG: Clear to percussion bilaterally; No rales, rhonchi, wheezing, or rubs  HEART: Regular rate and rhythm; No murmurs, rubs, or gallops  ABDOMEN: Soft, Nontender, Nondistended; Bowel sounds present  EXTREMITIES:  2+ Peripheral Pulses, No clubbing, cyanosis, or edema  LYMPH: No lymphadenopathy noted  SKIN: No rashes or lesions    Care Discussed with Consultants/Other Providers [x ] YES  [ ] NO      Code Status: [] Full Code [] DNR [] DNI [] Goals of Care:   Disposition: [] ICU [] Stroke Unit [] RCU []PCU []Floor [] Discharge Home         OMA DaveP

## 2018-08-06 NOTE — PROGRESS NOTE ADULT - PROBLEM SELECTOR PLAN 3
Mild. Pattern c/w hepatocellular etiology likely in setting of multiple liver lesions   -Evaluate liver lesions as stated above  -monitor LFTs\  -since pt has not been followed by a PMD will also obtain HIV and Hepatitis panel
Mild. Pattern c/w hepatocellular etiology likely in setting of multiple liver lesions   -Evaluate liver lesions as stated above  -monitor LFTs\  -since pt has not been followed by a PMD will also obtain HIV and Hepatitis panel
Mild. Pattern c/w hepatocellular etiology likely in setting of multiple liver lesions   -Evaluate liver lesions as stated above  -monitor LFTs\.check Hepatitis profile  -since pt has not been followed by a PMD will also obtain HIV and Hepatitis panel
Mild. Pattern c/w hepatocellular etiology likely in setting of multiple liver lesions   -Evaluate liver lesions as stated above  -monitor LFTs\  -since pt has not been followed by a PMD will also obtain HIV and Hepatitis panel
Mild. Pattern c/w hepatocellular etiology likely in setting of multiple liver lesions   -Evaluate liver lesions as stated above  -monitor LFTs\.check Hepatitis profile  -since pt has not been followed by a PMD will also obtain HIV and Hepatitis panel
Mild. Pattern c/w hepatocellular etiology likely in setting of multiple liver lesions   -Evaluate liver lesions as stated above  Hepatitis profile-neg
Mild. Pattern c/w hepatocellular etiology likely in setting of multiple liver lesions   -Evaluate liver lesions as stated above  -monitor LFTs\  -since pt has not been followed by a PMD will also obtain HIV and Hepatitis panel

## 2018-08-06 NOTE — PROGRESS NOTE ADULT - PROBLEM SELECTOR PLAN 1
-Liver lesion-r/o malignancy  -GI Consult appreciated.Possible Liver biopsy by IR.Explained to pt.  -d/w family at bedside
-Liver lesion-r/o malignancy  -GI Consult  -d/w family at bedside
-Liver lesion-r/o malignancy-likely primary Hepatocellular ca  -GI Consult appreciated.s/p Liver biopsy by IR..f/u biopsy result.Oncology consult appreciated.Patient would need f/u by oncology/PCP as outpt.  -d/w family at bedside
-Liver lesion-r/o malignancy  -GI Consult appreciated.Possible Liver biopsy by IR.Explained to pt.  -d/w family at bedside
-Liver lesion-r/o malignancy-likely primary Hepatocellular ca  -GI Consult appreciated.s/p Liver biopsy by IR..f/u biopsy result.Oncology consult appreciated.Patient would need f/u by oncology/PCP as outpt.  -d/w family at bedside
-Liver lesion-r/o malignancy-likely primary Hepatocellular ca  -GI Consult appreciated.s/p Liver biopsy by IR..f/u biopsy result.Oncology consult appreciated.Patient would need f/u by oncology/PCP as outpt.  Liver biopsy result-Metastatic liver ca-origin likely pancreatic/biliary origin.Informed to Daughter,explained to her and her  in detail.Advised to call Medical clinic and oncology f/u at [facility] or [facility].My office no.was given.They understood well.  -d/w family at bedside
-Liver lesion-r/o malignancy-likely primary Hepatocellular ca  -GI Consult appreciated.Possible Liver biopsy by IR.Explained to pt.  -d/w family at bedside

## 2018-08-06 NOTE — PROGRESS NOTE ADULT - PROVIDER SPECIALTY LIST ADULT
Gastroenterology
Hepatology
Internal Medicine

## 2018-08-06 NOTE — PROGRESS NOTE ADULT - PROBLEM SELECTOR PLAN 5
DVT ppx- lovenox SQ

## 2018-08-06 NOTE — PROGRESS NOTE ADULT - PROBLEM SELECTOR PLAN 4
Microcytic anemia. Pt states that she has been in menopause for about 2 years. No prior malignancy screening. No baseline to compare  -iron studies in the am  -malignancy w/u as stated above
Microcytic anemia. monitor h/h
Microcytic anemia. Pt states that she has been in menopause for about 2 years. No prior malignancy screening. No baseline to compare  -iron studies in the am  -malignancy w/u as stated above

## 2018-08-06 NOTE — PROGRESS NOTE ADULT - PROBLEM SELECTOR PLAN 2
Numerous bilobar liver lesions seen on MRI. In the setting of multiple lesions, and microcytic anemia in a menopausal female w/o respiratory symptoms suspect primary in GI tract vs primary liver (less likely).  -will obtain CT chest to evaluate for lung pathology    -will consult GI for possible colonoscopy
Numerous bilobar liver lesions seen on MRI. In the setting of multiple lesions, and microcytic anemia in a menopausal female w/o respiratory symptoms suspect primary in GI tract vs primary liver (less likely).  -will obtain CT chest to evaluate for lung pathology    -will consult GI for possible colonoscopy
as above,r/o malignancy
-as above
Numerous bilobar liver lesions seen on MRI. In the setting of multiple lesions, and microcytic anemia in a menopausal female w/o respiratory symptoms suspect primary in GI tract vs primary liver (less likely).  -will obtain CT chest to evaluate for lung pathology    -will consult GI for possible colonoscopy
as above,r/o malignancy

## 2018-08-06 NOTE — PROGRESS NOTE ADULT - PROBLEM SELECTOR PROBLEM 2
Liver masses

## 2018-08-06 NOTE — PROGRESS NOTE ADULT - PROBLEM SELECTOR PROBLEM 3
Transaminitis

## 2018-08-06 NOTE — PROGRESS NOTE ADULT - ATTENDING COMMENTS
-d/w family at bedside  d/w staff
-d/w family at bedside.-d/w Daughter.All questions answered  d/w staff
-d/w family at bedside.-d/w Daughter.All questions answered  d/w staff-NP/RN  -f/u liver biopsy result,oncology f/u
-d/w family at bedside  d/w staff
-d/w family at bedside.-d/w Daughter.All questions answered  -stable for d/c home.f/u with PCP,ONCOLOGY IN 1-2 WEEKS,explained to daughter.Report of liver biopsy given to Daughter.  -d/w staff
-d/w family at bedside.-d/w Daughter.All questions answered  d/w staff
-d/w family at bedside.-d/w Daughter.All questions answered  d/w staff-NP/RN  -f/u liver biopsy result,oncology f/u.Awaiting decision re f/u by oncology
-d/w family at bedside.-d/w Daughter.All questions answered  d/w staff-NP/RN  -f/u liver biopsy result,oncology f/u.Awaiting decision re f/u by oncology.stable
-d/w family at bedside.-d/w Daughter.All questions answered  d/w staff-NP/RN  -f/u liver biopsy result,oncology f/u.Awaiting decision re f/u by oncology.stable  -d/c planning
-d/w family at bedside.stable  d/w staff

## 2018-08-06 NOTE — PROGRESS NOTE ADULT - SUBJECTIVE AND OBJECTIVE BOX
CESAR SIMS  58y  Female      Patient is a 58y old  Female who presents with a chief complaint of RUQ abdominal Pain (01 Aug 2018 16:51)  Above noted.Pathology report received today in computer.Metastatic liver ca-origin likely pancreatic/biliary origin.  no abdomeninal pain,no sob,no cp,no fever,eating    REVIEW OF SYSTEMS:  as above  INTERVAL HPI/OVERNIGHT EVENTS:  T(C): 36.8 (08-06-18 @ 13:33), Max: 37.1 (08-05-18 @ 20:18)  HR: 88 (08-06-18 @ 13:33) (75 - 88)  BP: 109/75 (08-06-18 @ 13:33) (102/64 - 114/71)  RR: 16 (08-06-18 @ 13:33) (16 - 18)  SpO2: 98% (08-06-18 @ 13:33) (98% - 98%)  Wt(kg): --  I&O's Summary    T(C): 36.8 (08-06-18 @ 13:33), Max: 37.1 (08-05-18 @ 20:18)  HR: 88 (08-06-18 @ 13:33) (75 - 88)  BP: 109/75 (08-06-18 @ 13:33) (102/64 - 114/71)  RR: 16 (08-06-18 @ 13:33) (16 - 18)  SpO2: 98% (08-06-18 @ 13:33) (98% - 98%)  Wt(kg): --Vital Signs Last 24 Hrs  T(C): 36.8 (06 Aug 2018 13:33), Max: 37.1 (05 Aug 2018 20:18)  T(F): 98.2 (06 Aug 2018 13:33), Max: 98.7 (05 Aug 2018 20:18)  HR: 88 (06 Aug 2018 13:33) (75 - 88)  BP: 109/75 (06 Aug 2018 13:33) (102/64 - 114/71)  BP(mean): --  RR: 16 (06 Aug 2018 13:33) (16 - 18)  SpO2: 98% (06 Aug 2018 13:33) (98% - 98%)    LABS:                        10.4   7.33  )-----------( 188      ( 06 Aug 2018 07:27 )             34.8     08-06    138  |  98  |  13  ----------------------------<  80  4.3   |  24  |  0.67    Ca    9.7      06 Aug 2018 07:27    TPro  7.2  /  Alb  3.7  /  TBili  1.2  /  DBili  0.4<H>  /  AST  121<H>  /  ALT  64<H>  /  AlkPhos  307<H>  08-06        CAPILLARY BLOOD GLUCOSE                PAST MEDICAL & SURGICAL HISTORY:  No pertinent past medical history  No significant past surgical history      MEDICATIONS  (STANDING):  enoxaparin Injectable 40 milliGRAM(s) SubCutaneous every 24 hours    MEDICATIONS  (PRN):  ondansetron Injectable 4 milliGRAM(s) IV Push every 6 hours PRN Vomiting        RADIOLOGY & ADDITIONAL TESTS:    Imaging Personally Reviewed:  [ ] YES  [ ] NO    Consultant(s) Notes Reviewed:  [ ] YES  [ ] NO    PHYSICAL EXAM:  GENERAL: NAD, well-groomed, well-developed  HEAD:  Atraumatic, Normocephalic  EYES: EOMI, PERRLA, conjunctiva and sclera clear  ENMT: No tonsillar erythema, exudates, or enlargement; Moist mucous membranes, Good dentition, No lesions  NECK: Supple, No JVD, Normal thyroid  NERVOUS SYSTEM:  Alert & Oriented X3, Good concentration; Motor Strength 5/5 B/L upper and lower extremities; DTRs 2+ intact and symmetric  CHEST/LUNG: Clear to percussion bilaterally; No rales, rhonchi, wheezing, or rubs  HEART: Regular rate and rhythm; No murmurs, rubs, or gallops  ABDOMEN: Soft, Nontender, Nondistended; Bowel sounds present,Hepatomegaly  EXTREMITIES:  2+ Peripheral Pulses, No clubbing, cyanosis, or edema  LYMPH: No lymphadenopathy noted  SKIN: No rashes or lesions    Care Discussed with Consultants/Other Providers [x ] YES  [ ] NO      Code Status: [] Full Code [] DNR [] DNI [] Goals of Care:   Disposition: [] ICU [] Stroke Unit [] RCU []PCU []Floor [] Discharge Home         OMA DaveP

## 2018-08-06 NOTE — PROGRESS NOTE ADULT - PROBLEM SELECTOR PROBLEM 1
R/O Abdominal pain

## 2018-09-13 NOTE — H&P ADULT - ASSESSMENT
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  Pt's course with progressive decline in functional status, and now with marked hyperbilirubinemia.

## 2018-09-13 NOTE — H&P ADULT - HISTORY OF PRESENT ILLNESS
57 y/o F with recently diagnosed cancer (likely biliary with mets to liver) presents with abdominal/epigastric pain.  Pt had been admitted to [facility] last month and discovered to have multiple liver lesions which on biopsy showed adenocarcinoma possibly of biliary origin.  Pt was discharged home on tramadol for pain.  Following discharge, she was admitted to [facility] for continued pain.  During admission to [facility], pt and family were told that chemotherapy was not an option, and was discharged with nurse visiting once weekly.  When asked, daughter thinks that pt was placed on hospice care.  Daughter reports that over the past 3 days, pt has become more lethargic, mostly laying in bed, speaking minimally, and mostly keeping her eyes closed.  She has become very jaundiced, and daughter was told at [facility] last week that her bilirubin was very high.  She has had minimal PO intake, and has been constipated.  Daughter reports abdomen has been distended for several weeks.  This afternoon, pt complained to daughter of burning epigastric and chest pain for which daughter gave tramadol and cold water with minimal relief.      When asked, pt reports some pain and nausea, but does not answer further questions.      In the ED, pt was given Pepcid and morphine 4mg IV.

## 2018-09-13 NOTE — ED ADULT TRIAGE NOTE - CHIEF COMPLAINT QUOTE
Pt BIBA p/w RUQ abdominal pain/weakness/lethargy x 2 hours ago.  Emily speaking visiting from [place] since may.  daughter preferred to translate.  Pt is A&Ox2/3.  Pt jaundiced upon arrival.  pmhx.  Stage IV Liver Ca. Pt BIBA p/w RUQ abdominal pain/weakness/lethargy x 2 hours ago.  Emily speaking visiting from [place] since may.  daughter preferred to translate.  Pt is A&Ox2/3.  Pt jaundiced upon arrival.  pmhx.  Stage IV Liver Ca.    addendum.  Attempt to use [facility]  for collateral information but unable to due to pts status. Pt BIBA p/w RUQ abdominal pain/weakness/lethargy x 2 hours ago.  Emily speaking visiting from [place] since may.  daughter preferred to translate but evident daughter not proficient with medical terms.  Pt is A&Ox2/3.  Pt jaundiced upon arrival.  pmhx.  Stage IV Liver Ca.    addendum.  Attempt to use [facility]  for collateral information but unable to due to pts status.

## 2018-09-13 NOTE — H&P ADULT - NSHPLABSRESULTS_GEN_ALL_CORE
09-13    124<L>  |  82<L>  |  45<H>  ----------------------------<  74  5.6<H>   |  12<L>  |  1.07    Ca    9.6      13 Sep 2018 20:50    TPro  4.7<L>  /  Alb  2.4<L>  /  TBili  31.3<H>  /  DBili  x   /  AST  413<H>  /  ALT  100<H>  /  AlkPhos  719<H>  09-13                            8.7    18.20 )-----------( 274      ( 13 Sep 2018 20:50 )             27.0             PT/INR - ( 13 Sep 2018 20:50 )   PT: 21.5 SEC;   INR: 1.85          PTT - ( 13 Sep 2018 20:50 )  PTT:40.8 SEC

## 2018-09-13 NOTE — ED ADULT NURSE NOTE - CHIEF COMPLAINT QUOTE
Pt BIBA p/w RUQ abdominal pain/weakness/lethargy x 2 hours ago.  Emily speaking visiting from [place] since may.  daughter preferred to translate but evident daughter not proficient with medical terms.  Pt is A&Ox2/3.  Pt jaundiced upon arrival.  pmhx.  Stage IV Liver Ca.    addendum.  Attempt to use [facility]  for collateral information but unable to due to pts status.

## 2018-09-13 NOTE — H&P ADULT - NSHPPHYSICALEXAM_GEN_ALL_CORE
Vital Signs Last 24 Hrs  T(C): 36.9 (13 Sep 2018 20:19), Max: 36.9 (13 Sep 2018 20:19)  T(F): 98.4 (13 Sep 2018 20:19), Max: 98.4 (13 Sep 2018 20:19)  HR: 104 (13 Sep 2018 21:07) (104 - 110)  BP: 130/82 (13 Sep 2018 21:07) (130/82 - 136/84)  BP(mean): --  RR: 16 (13 Sep 2018 21:07) (14 - 16)  SpO2: 98% (13 Sep 2018 21:07) (98% - 98%)    PHYSICAL EXAM:  GENERAL: moderate distress   EYES: icteric, [place]  ENMT: Moist mucous membranes   NECK: Supple  CHEST/LUNG: Clear to auscultation bilaterally  HEART: Tachy, regular rhythm; No murmurs, rubs, or gallops  ABDOMEN: Soft, Nontender, + distension with hepatomegaly; Bowel sounds normal  EXTREMITIES:   No clubbing, or cyanosis,  + 2 LE edema   PSYCH: unable to assess 2/2 lethargy  NEUROLOGY:   - Mental status Lethargic, responds to verbal stimuli  SKIN: Jaundice  MUSCULOSKELETAL: No joint swelling

## 2018-09-13 NOTE — ED PROVIDER NOTE - OBJECTIVE STATEMENT
58 F with hx of metastatic CA, recently admitted at [facility] and possibly on Hospice established by [facility], brought by family for worsening pain, heartburn.  Unclear if patient under hospice care, family states they were calling services but could not get response.  Patient has no other complaints.  No fever/chills.

## 2018-09-13 NOTE — ED ADULT NURSE NOTE - NSIMPLEMENTINTERV_GEN_ALL_ED
Implemented All Fall with Harm Risk Interventions:  [place] to call system. Call bell, personal items and telephone within reach. Instruct patient to call for assistance. Room bathroom lighting operational. Non-slip footwear when patient is off stretcher. Physically safe environment: no spills, clutter or unnecessary equipment. Stretcher in lowest position, wheels locked, appropriate side rails in place. Provide visual cue, wrist band, yellow gown, etc. Monitor gait and stability. Monitor for mental status changes and reorient to person, place, and time. Review medications for side effects contributing to fall risk. Reinforce activity limits and safety measures with patient and family. Provide visual clues: red socks.

## 2018-09-13 NOTE — ED ADULT NURSE NOTE - OBJECTIVE STATEMENT
pt received to the ed Emily speaking, daughter acting as , arrived for weakness, lethargy and abd painx2 few hours. pt appears jaundice and only responsive to painful stimuli. as per daughter pt came from [place] and was on hospice care. pt has 20g placed in left arm, labs drawn and sent, pt medicated as per ordered. plan as per MD Seymour is to admit for eventual hospice care. will continue to monitor.

## 2018-09-13 NOTE — H&P ADULT - PROBLEM SELECTOR PLAN 5
Discussed goals of care at length with daughter via telephone who states that she does not want any aggressive testing or treatment.  She understands the severity and advanced stage of pt's illness.  Discussed code status, and daughter seems to favor DNR, but would like to discuss further face to face.    - will call palliative care consult in am

## 2018-09-14 NOTE — PROGRESS NOTE ADULT - PROBLEM SELECTOR PLAN 3
Possibly infection, or hepatic encephalopathy  -EMPIRIC ANTIBIOTIC,LACTULOSE  -GI,ID Consult appreciated.

## 2018-09-14 NOTE — PROGRESS NOTE ADULT - SUBJECTIVE AND OBJECTIVE BOX
CESAR SIMS  58y  Female      Patient is a 58y old  Female who presents with a chief complaint of abdominal pain (14 Sep 2018 16:24)  -Above noted.H and P reviewed and agreed.Admitted with abdominal pain,lethargy,leucocytosis    REVIEW OF SYSTEMS:  Unable to obtain sec.to pt's Mental status    INTERVAL HPI/OVERNIGHT EVENTS:  T(C): 36.4 (09-14-18 @ 05:00), Max: 36.9 (09-13-18 @ 20:19)  HR: 104 (09-14-18 @ 13:54) (100 - 110)  BP: 130/76 (09-14-18 @ 13:54) (124/79 - 136/84)  RR: 17 (09-14-18 @ 13:54) (14 - 20)  SpO2: 98% (09-14-18 @ 13:54) (96% - 98%)  Wt(kg): --  I&O's Summary    T(C): 36.4 (09-14-18 @ 05:00), Max: 36.9 (09-13-18 @ 20:19)  HR: 104 (09-14-18 @ 13:54) (100 - 110)  BP: 130/76 (09-14-18 @ 13:54) (124/79 - 136/84)  RR: 17 (09-14-18 @ 13:54) (14 - 20)  SpO2: 98% (09-14-18 @ 13:54) (96% - 98%)  Wt(kg): --Vital Signs Last 24 Hrs  T(C): 36.4 (14 Sep 2018 05:00), Max: 36.9 (13 Sep 2018 20:19)  T(F): 97.5 (14 Sep 2018 05:00), Max: 98.4 (13 Sep 2018 20:19)  HR: 104 (14 Sep 2018 13:54) (100 - 110)  BP: 130/76 (14 Sep 2018 13:54) (124/79 - 136/84)  BP(mean): --  RR: 17 (14 Sep 2018 13:54) (14 - 20)  SpO2: 98% (14 Sep 2018 13:54) (96% - 98%)    LABS:                        8.9    17.20 )-----------( 285      ( 14 Sep 2018 07:40 )             27.4     09-14    126<L>  |  87<L>  |  50<H>  ----------------------------<  78  5.3   |  10<LL>  |  1.21    Ca    9.4      14 Sep 2018 07:40  Phos  3.7     09-14  Mg     2.5     09-14    TPro  4.6<L>  /  Alb  2.3<L>  /  TBili  32.0<H>  /  DBili  x   /  AST  443<H>  /  ALT  100<H>  /  AlkPhos  724<H>  09-14    PT/INR - ( 13 Sep 2018 20:50 )   PT: 21.5 SEC;   INR: 1.85          PTT - ( 13 Sep 2018 20:50 )  PTT:40.8 SEC    CAPILLARY BLOOD GLUCOSE      POCT Blood Glucose.: 83 mg/dL (14 Sep 2018 18:10)  POCT Blood Glucose.: 116 mg/dL (14 Sep 2018 14:57)  POCT Blood Glucose.: 110 mg/dL (14 Sep 2018 08:47)  POCT Blood Glucose.: 75 mg/dL (14 Sep 2018 01:22)            PAST MEDICAL & SURGICAL HISTORY:  Metastatic adenocarcinoma  No significant past surgical history      MEDICATIONS  (STANDING):  HYDROmorphone  Injectable 0.2 milliGRAM(s) IV Push every 8 hours  lactulose Retention Enema 200 Gram(s) Rectal every 6 hours  piperacillin/tazobactam IVPB. 3.375 Gram(s) IV Intermittent every 8 hours  sodium bicarbonate  Infusion 0.086 mEq/kG/Hr (75 mL/Hr) IV Continuous <Continuous>  vancomycin  IVPB 750 milliGRAM(s) IV Intermittent every 12 hours    MEDICATIONS  (PRN):  HYDROmorphone  Injectable 0.2 milliGRAM(s) IV Push every 3 hours PRN moderate to severe pain  HYDROmorphone  Injectable 0.2 milliGRAM(s) IV Push every 3 hours PRN Dyspnea        RADIOLOGY & ADDITIONAL TESTS:    Imaging Personally Reviewed:  [ ] YES  [ ] NO    Consultant(s) Notes Reviewed:  [ x] YES  [ ] NO    PHYSICAL EXAM:  GENERAL:Lethargic,poorly responsive,jaundiced   HEAD:  Atraumatic, Normocephalic  NECK: Supple, No JVD, Normal thyroid  NERVOUS SYSTEM:  Disoriented  CHEST/LUNG: clear,decreased BS  HEART: Regular rate and rhythm; No murmurs, rubs, or gallops  ABDOMEN: Distended,NT,decreased BOWEL SOUNDS  EXTREMITIES:   edema 2+  LYMPH: No lymphadenopathy noted  SKIN: No rashes or lesions,JAUNDICED    Care Discussed with Consultants/Other Providers [ X] YES  [ ] NO      Code Status: [] Full Code X[] DNR [] DNI [] Goals of Care:   Disposition: [] ICU [] Stroke Unit [] RCU []PCU []Floor [] Discharge Home         GEOFFREY [email or web address]

## 2018-09-14 NOTE — CONSULT NOTE ADULT - PROBLEM SELECTOR RECOMMENDATION 9
Etiology?  Possible sec to liver failure  SIADH sec to malignancy can not be ruled out  Free water restriction 1L/day  Avoid hypotonic fluids  get Urine Na, Urine Osmolality, Serum Osmolality Etiology?  Possible sec to liver failure  SIADH sec to malignancy can not be ruled out  Free water restriction 1L/day  Avoid hypotonic fluids  get Urine Na, Urine Osmolality, Serum Osmolality  Monitor Na level q 12  Na level should not increase more than 8 meq/24 hrs

## 2018-09-14 NOTE — CONSULT NOTE ADULT - SUBJECTIVE AND OBJECTIVE BOX
Patient is a 58y old  Female who presents with a chief complaint of abdominal pain (13 Sep 2018 23:35)      HPI:  57 y/o F with recently diagnosed cancer (likely biliary with mets to liver) presents with abdominal/epigastric pain.  Pt had been admitted to [facility] last month and discovered to have multiple liver lesions which on biopsy showed adenocarcinoma possibly of biliary origin.  Pt was discharged home on tramadol for pain.  Following discharge, she was admitted to [facility] for continued pain.  During admission to [facility], pt and family were told that chemotherapy was not an option, and was discharged with nurse visiting once weekly.  When asked, daughter thinks that pt was placed on hospice care.  Daughter reports that over the past 3 days, pt has become more lethargic, mostly laying in bed, speaking minimally, and mostly keeping her eyes closed.  She has become very jaundiced, and daughter was told at [facility] last week that her bilirubin was very high.  She has had minimal PO intake, and has been constipated.  Daughter reports abdomen has been distended for several weeks.  This afternoon, pt complained to daughter of burning epigastric and chest pain for which daughter gave tramadol and cold water with minimal relief.      When asked, pt reports some pain and nausea, but does not answer further questions.      In the ED, pt was given Pepcid and morphine 4mg IV. (13 Sep 2018 23:35)      PAST MEDICAL & SURGICAL HISTORY:  Metastatic adenocarcinoma  No significant past surgical history      Allergies  No Known Allergies        ANTIMICROBIALS:  N/A    OTHER MEDS: MEDICATIONS  (STANDING):  lactulose Retention Enema 200 every 6 hours  morphine  - Injectable 1 every 3 hours PRN      SOCIAL HISTORY:  Never smoker, no alcohol use    FAMILY HISTORY:  Family history of cancer (Sibling): Brother with urinary tract malignancy at age 65      REVIEW OF SYSTEMS  All other systems negative except as noted below:	    Constitutional:  [ ] fever [ ] chills  [ ] weight loss  [ ] weakness  Skin:  [ ] rash [ ] phlebitis	  Eyes: [ ] icterus [ ] pain  [ ] discharge	  ENMT: [ ] sore throat  [ ] thrush [ ] ulcers [ ] exudates  Respiratory: [ ] dyspnea [ ] hemoptysis [ ] cough [ ] sputum	  Cardiovascular:  [ ] chest pain [ ] palpitations [ ] edema	  Gastrointestinal:  [ ] nausea [ ] vomiting [ ] diarrhea [ ] constipation [ ] pain	  Genitourinary:  [ ] dysuria [ ] frequency [ ] hematuria [ ] discharge [ ] flank pain  [ ] incontinence  Musculoskeletal:  [ ] myalgias [ ] arthralgias [ ] arthritis  [ ] back pain  Neurological:  [ ] headache [ ] seizures  [ ] confusion/altered mental status  Psychiatric:  [ ] anxiety [ ] depression	  Hematology/Lymphatics:  [ ] lymphadenopathy  Endocrine:  [ ] adrenal [ ] thyroid  Allergic/Immunologic:	 [ ] transplant [ ] seasonal    Vital Signs Last 24 Hrs  T(F): 97.5 (09-14-18 @ 05:00), Max: 98.4 (09-13-18 @ 20:19)    Vital Signs Last 24 Hrs  HR: 100 (09-14-18 @ 10:40) (100 - 110)  BP: 135/68 (09-14-18 @ 10:40) (124/79 - 136/84)  RR: 18 (09-14-18 @ 10:40)  SpO2: 98% (09-14-18 @ 10:40) (96% - 98%)  Wt(kg): --    PHYSICAL EXAM:  General: non-toxic  HEAD/EYES: anicteric, PERRL  ENT:  supple  Cardiovascular:   S1, S2  Respiratory:  clear bilaterally  GI:  soft, non-tender, normal bowel sounds  :  no CVA tenderness   Musculoskeletal:  no synovitis  Neurologic:  grossly non-focal  Skin:  no rash  Lymph: no lymphadenopathy  Psychiatric:  appropriate affect  Vascular:  no phlebitis                          8.9    17.20 )-----------( 285      ( 14 Sep 2018 07:40 )             27.4       09-14    126<L>  |  87<L>  |  50<H>  ----------------------------<  78  5.3   |  10<LL>  |  1.21    Ca    9.4      14 Sep 2018 07:40  Phos  3.7     09-14  Mg     2.5     09-14    TPro  4.6<L>  /  Alb  2.3<L>  /  TBili  32.0<H>  /  DBili  x   /  AST  443<H>  /  ALT  100<H>  /  AlkPhos  724<H>  09-14      RADIOLOGY:  imaging below personally reviewed Patient is a 58y old  Female who presents with a chief complaint of abdominal pain (13 Sep 2018 23:35)      HPI:  57 y/o F with recently diagnosed cancer (likely biliary with mets to liver) presents with abdominal/epigastric pain.  Pt had been admitted to [facility] last month and discovered to have multiple liver lesions which on biopsy showed adenocarcinoma possibly of biliary origin.  Pt was discharged home on tramadol for pain.  Following discharge, she was admitted to [facility] for continued pain.  During admission to [facility], pt and family were told that chemotherapy was not an option, and was discharged with nurse visiting once weekly.  When asked, daughter thinks that pt was placed on hospice care.  Daughter reports that over the past 3 days, pt has become more lethargic, mostly laying in bed, speaking minimally, and mostly keeping her eyes closed.  She has become very jaundiced, and daughter was told at [facility] last week that her bilirubin was very high.  She has had minimal PO intake, and has been constipated.  Daughter reports abdomen has been distended for several weeks.  This afternoon, pt complained to daughter of burning epigastric and chest pain for which daughter gave tramadol and cold water with minimal relief.      When asked, pt reports some pain and nausea, but does not answer further questions.      In the ED, pt was given Pepcid and morphine 4mg IV. (13 Sep 2018 23:35)      PAST MEDICAL & SURGICAL HISTORY:  Metastatic adenocarcinoma  No significant past surgical history      Allergies  No Known Allergies      ANTIMICROBIALS:  N/A    OTHER MEDS: MEDICATIONS  (STANDING):  lactulose Retention Enema 200 every 6 hours  morphine  - Injectable 1 every 3 hours PRN      SOCIAL HISTORY:  Never smoker, no alcohol use    FAMILY HISTORY:  Family history of cancer (Sibling): Brother with urinary tract malignancy at age 65      REVIEW OF SYSTEMS  All other systems negative except as noted below:	    Constitutional:  [ ] fever [ ] chills  [ ] weight loss  [x ] weakness  Skin:  [ ] rash [ ] phlebitis [x] jaudice	  Eyes:  [ ] pain  [ ] discharge	  ENMT: [ ] sore throat  [ ] thrush [ ] ulcers [ ] exudates  Respiratory: [ ] dyspnea [ ] hemoptysis [ ] cough [ ] sputum	  Cardiovascular:  [ ] chest pain [ ] palpitations [ ] edema	  Gastrointestinal:  [ ] nausea [x ] vomiting [ ] diarrhea [ ] constipation [ x] pain	  Genitourinary:  [ ] dysuria [ ] frequency [ ] hematuria [ ] discharge [ ] flank pain  [ ] incontinence  Musculoskeletal:  [ ] myalgias [ ] arthralgias [ ] arthritis  [ ] back pain  Neurological:  [ ] headache [ ] seizures  [ x] confusion/altered mental status  Psychiatric:  [ ] anxiety [ ] depression	  Hematology/Lymphatics:  [ ] lymphadenopathy  Endocrine:  [ ] adrenal [ ] thyroid  Allergic/Immunologic:	 [ ] transplant [ ] seasonal    Vital Signs Last 24 Hrs  T(F): 97.5 (09-14-18 @ 05:00), Max: 98.4 (09-13-18 @ 20:19)    Vital Signs Last 24 Hrs  HR: 100 (09-14-18 @ 10:40) (100 - 110)  BP: 135/68 (09-14-18 @ 10:40) (124/79 - 136/84)  RR: 18 (09-14-18 @ 10:40)  SpO2: 98% (09-14-18 @ 10:40) (96% - 98%)  Wt(kg): --    PHYSICAL EXAM:  General: lethargic, not responding to verbal stimuli  HEAD/EYES: Eyes closed, did not wake up on being called  ENT:  supple  Cardiovascular:   S1, S2, no murmurs, rubs or gallops  Respiratory:  clear bilaterally  GI:  distended abdomen, taut, tender, bowel sounds present  :  no CVA tenderness   Musculoskeletal:  no synovitis  Neurologic:  unable to assess  Skin:  no rash, + jaundice  Lymph: no lymphadenopathy  Vascular:  no phlebitis  Extremities: 2+ edema in bilateral legs                        8.9    17.20 )-----------( 285      ( 14 Sep 2018 07:40 )             27.4       09-14    126<L>  |  87<L>  |  50<H>  ----------------------------<  78  5.3   |  10<LL>  |  1.21    Ca    9.4      14 Sep 2018 07:40  Phos  3.7     09-14  Mg     2.5     09-14    TPro  4.6<L>  /  Alb  2.3<L>  /  TBili  32.0<H>  /  DBili  x   /  AST  443<H>  /  ALT  100<H>  /  AlkPhos  724<H>  09-14

## 2018-09-14 NOTE — CONSULT NOTE ADULT - PROBLEM SELECTOR RECOMMENDATION 2
AG acidosis  Likely sec to malignancy  Change the IVF from NS to 1/2NS with 75meq NaHCO3
Pain pressure like  May benefit from diagnostic/ therapeutic tap   Family understands fluid will reaccumulate  Recommend Dilaudid 0.2 mg TID ATC   Recommend Dilaudid 0.2 mg Q3H PRN for pain for breakthrough   Bowel regimen- on lactulose supp

## 2018-09-14 NOTE — CONSULT NOTE ADULT - PROBLEM SELECTOR RECOMMENDATION 9
afebriles - Afebrile patient, with accompanying anemia of chronic disease  - Neutrophil-predominant leukocytosis with left shift  - DDx: leukemoid reaction 2/2 non-hematologic malignancy  - Monitor off antibiotics  - Consider heme/onc consult Abdominal pain and ascites in 58 F with newly diagnosed metastatic adenocarcinoma, most likely biliary in origin. Patient is lethargic, jaundiced and not responding to verbal stimuli.  - Neutrophil-predominant leukocytosis with left shift  - DDx: SBP vs leukemoid reaction 2/2 non-hematologic malignancy  - Diagnostic and therapeutic paracentesis  - Palliative was consulted and will speak to daughter about GOC. Abdominal pain and ascites in 58 F with newly diagnosed metastatic adenocarcinoma, most likely biliary in origin. Patient is lethargic, jaundiced and not responding to verbal stimuli.  - Neutrophil-predominant leukocytosis with left shift  - DDx: SBP vs cholangitis vs leukemoid reaction 2/2 non-hematologic malignancy  - Repeat blood and urine culture  - Recommend CT abdomen if patient improves  - Empiric treatment with IV zosyn and 750 mg IV vancomycin q12  - If ascites is present, can consider paracentesis  - Palliative was consulted and spoke to daughter about GOC. Family is leaning towards comfort care, but not yet established as a hospice candidate. Patient also requires oncology evaluation. Malignancy is primary problem.

## 2018-09-14 NOTE — GOALS OF CARE CONVERSATION - PERSONAL ADVANCE DIRECTIVE - CONVERSATION DETAILS
Face to face discussion was held with patients daughter Daxa Agrawal. Patient's family consists of her , 2 daughters, and one son all of whom reside in [place] except for her daughter Tanja who lives here.  Patient was visiting her daughter as she just had a baby and she was coming to visit to help. Daughter stated she understands that her mother has liver cancer and has been in and out the hospital for the last month or so. Most recently she was at [facility] and was being seen by oncologist Dr. Azul there. Originally treatment was offered, but patients functionality declined over the last few weeks. Last Friday Dr. Azul broke the news that there is no further treatment as patient will not be able to handle it due to poor functional status.       Her last wishes have been to see her family by her bedside most noted her desire to see her son. Despite efforts of Dr. Azul providing a letter for the family to obtain visa, it was declined to her son. Her  will be arriving tomorrow 9/15 whom does not grasp the acute severity of patients prognosis and decline.       I Face to face discussion was held with patients daughter Daxa Agrawal. Patient's family consists of her , 2 daughters, and one son all of whom reside in [place] except for her daughter Tanja who lives here.  Patient was visiting her daughter as she just had a baby and she was coming to visit to help. Daughter stated she understands that her mother has liver cancer and has been in and out the hospital for the last month or so. Most recently she was at [facility] and was being seen by oncologist Dr. Azul there. Originally treatment was offered, but patients functionality declined over the last few weeks. Last Friday Dr. Azul broke the news that there is no further treatment as patient will not be able to handle it due to poor functional status.       Her last wishes have been to see her family by her bedside most noted her desire to see her son. Despite efforts of Dr. Azul providing a letter for the family to obtain visa, it was declined to her son. Her  will be arriving tomorrow 9/15 whom does not grasp the acute severity of patients prognosis and decline.       Patients current clinical status and the progression of the cancer was explained. She understands her mother is dying and wants to keep her comfortable. She would like a trial of abx if SBP is suspected. This would mean that there is a set course of days that we would treat with antibiotics, and if the antibiotics do not reverse her condition. The daughter understands that focus of treatment would be symptom/comfort management. She also agrees to a tap if possible, to acutely relieve her of her dyspnea. Daughter stated she would not want to cause further suffering, and made patient DNR/DNI. Ideally after acute medical management, they would like to bring the patient home with hospice if possible. Face to face discussion was held with patients daughter Daxa Agrawal. Patient's family consists of her , 2 daughters, and one son all of whom reside in [place] except for her daughter Tanja who lives here.  Patient was visiting her daughter as she just had a baby and she was coming to visit to help. Daughter stated she understands that her mother has liver cancer and has been in and out the hospital for the last month or so. Most recently she was at [facility] and was being seen by oncologist Dr. Azul there. Originally treatment was offered, but patients functionality declined over the last few weeks. Last Friday Dr. Azul broke the news that there is no further treatment as patient will not be able to handle it due to poor functional status.       Her last wishes have been to see her family by her bedside most noted her desire to see her son. Despite efforts of Dr. Azul providing a letter for the family to obtain visa, it was declined to her son. Her  will be arriving tomorrow 9/15 whom does not grasp the acute severity of patients prognosis and decline.       Patients current clinical status and the progression of the cancer was explained. She understands her mother is dying and wants to keep her comfortable. She would like a trial of abx if SBP is suspected. This would mean that there is a set course of days that we would treat with antibiotics, and if the antibiotics do not reverse her condition, the daughter understands that focus of treatment would be symptom/comfort management. She also agrees to a tap if possible, to acutely relieve her of her dyspnea. Daughter stated she would not want to cause further suffering, and made patient DNR/DNI. Ideally after acute medical management, they would like to bring the patient home with hospice if possible.

## 2018-09-14 NOTE — CONSULT NOTE ADULT - ATTENDING COMMENTS
Metastatic CA to liver  Rapid rise bilirubin  Pt jaundiced, encephalopathic  Likely complaints due to metastatic burden, can not rule out biliary blockage to stent  Pt not currently stable for any invasive procedure  Await imaging
58 year old with metastatic adenocarcinoma presents with poorly controlled abdominal pain with nausea dn vomiting.  She has hyperbilirubinemia.    She has a significant leukocytosis but has been afebrile.    Unclear is leukocytosis is reactive to infection or a leukemoid reaction to underlying infection    Her primary problem is her underlying malignancy.  Her prognosis is grave due to her underlying malignancy.  She is at risk for infection but any infection would be secondary to her untreated malignancy.  She needs  an oncology evaluation and  palliative evaluation.    With regards to infectious work up, we can check blood cultures times two ( to assess for cholangitis) .  Consider checking a CT a/p to rule out an abdominal focus. If pt has ascites, consider paracentesis.    Can start empiric antibiotics with zosyn and vancomycin 750 mg iv q 12 pending cultures and imaging    Prognosis grave.
Patient seen and examined.  Agree with fellow note .

## 2018-09-14 NOTE — CONSULT NOTE ADULT - PROBLEM SELECTOR RECOMMENDATION 5
Please see GOC note  Discussion was done with aide of Dr. Don in patients native language (jenni)  Critical time spent: 45 min

## 2018-09-14 NOTE — GOALS OF CARE CONVERSATION - PERSONAL ADVANCE DIRECTIVE - CONVERSATION DETAILS
[facility]    Pt evaluation for hospice care done and meeting held with pt's daughter Daxa Agrawal ([phone]).    Hospice Care, services, eligibility and consents explained. Pt's family considering home hospice care, however, pt is receiving IV pain management. Explained to daughter pt may require inpt hospice care, unless she can swallow medications for symptom management.  Pt can be offered inpt care at the [facility] in [place] (only  - none of  the other inpt hospice units will be able accommodate pt), if pt remains in need of IV medications to manage pain or other distressing symptoms.     Hospice RN will follow up with pt and family on Monday 9/17/18. Pt is not medically ready for discharge today.    There is no consent for hospice care at this time.

## 2018-09-14 NOTE — PROGRESS NOTE ADULT - ATTENDING COMMENTS
-Palliative consult appreciated.Pain management  -Paracentesis-Therapeutic by IR,if tolerated.SUPPORTIVE CARE  -Family is aware of poor prognosis  -DNR  -D/W STAFF

## 2018-09-14 NOTE — CONSULT NOTE ADULT - SUBJECTIVE AND OBJECTIVE BOX
Dr. Martins  Office [phone]  Cell [phone]  Carmen CALDWELL  Cell [phone]    HPI:  59 y/o F with recently diagnosed cancer (likely biliary with mets to liver) presents with abdominal/epigastric pain. Found to have electrolyte abnormality for which I got involved. Patient is very lethargic not communicating at present. History documented here is from chart review      Allergies:  No Known Allergies      PAST MEDICAL & SURGICAL HISTORY:  Metastatic adenocarcinoma  No significant past surgical history      Home Medications Reviewed    Hospital Medications:   MEDICATIONS  (STANDING):  HYDROmorphone  Injectable 0.2 milliGRAM(s) IV Push every 8 hours  lactulose Retention Enema 200 Gram(s) Rectal every 6 hours  piperacillin/tazobactam IVPB. 3.375 Gram(s) IV Intermittent once  piperacillin/tazobactam IVPB. 3.375 Gram(s) IV Intermittent every 8 hours  sodium chloride 0.9%. 1000 milliLiter(s) (75 mL/Hr) IV Continuous <Continuous>  vancomycin  IVPB 750 milliGRAM(s) IV Intermittent every 12 hours      SOCIAL HISTORY:  Denies ETOh, Smoking,     FAMILY HISTORY:  Family history of cancer (Sibling): Brother with urinary tract malignancy at age 65      REVIEW OF SYSTEMS:  Could not be obtained patient is very lethargic and not communicating    VITALS:  T(F): 97.5 (09-14-18 @ 05:00), Max: 98.4 (09-13-18 @ 20:19)  HR: 104 (09-14-18 @ 13:54)  BP: 130/76 (09-14-18 @ 13:54)  RR: 17 (09-14-18 @ 13:54)  SpO2: 98% (09-14-18 @ 13:54)  Wt(kg): --    Height (cm): 167.64 (09-13 @ 23:56)  Weight (kg): 65.3 (09-13 @ 23:56)  BMI (kg/m2): 23.2 (09-13 @ 23:56)  BSA (m2): 1.74 (09-13 @ 23:56)    PHYSICAL EXAM:  Constitutional: NAD  HEENT: icteric sclera, oropharynx clear, MMM  Neck: No JVD  Respiratory: CTAB, no wheezes, rales or rhonchi  Cardiovascular: S1, S2, RRR  Gastrointestinal: BS+, soft, distended  Extremities: No cyanosis or clubbing. 2+ peripheral edema  Neurological: lethargic, could not evaluate  : No CVA tenderness.    Skin: Yellowish       LABS:  09-14    126<L>  |  87<L>  |  50<H>  ----------------------------<  78  5.3   |  10<LL>  |  1.21    Ca    9.4      14 Sep 2018 07:40  Phos  3.7     09-14  Mg     2.5     09-14    TPro  4.6<L>  /  Alb  2.3<L>  /  TBili  32.0<H>  /  DBili      /  AST  443<H>  /  ALT  100<H>  /  AlkPhos  724<H>  09-14    Creatinine Trend: 1.21 <--, 1.06 <--, 1.07 <--                        8.9    17.20 )-----------( 285      ( 14 Sep 2018 07:40 )             27.4     Urine Studies:        RADIOLOGY & ADDITIONAL STUDIES:

## 2018-09-14 NOTE — CONSULT NOTE ADULT - SUBJECTIVE AND OBJECTIVE BOX
Chief Complaint:  Patient is a 58y old  Female who presents with a chief complaint of abdominal pain (14 Sep 2018 12:35)      HPI:  58 year old female with recently diagnosed cancer (likely biliary with mets to liver) presented to the emergency room with chief complain of worsening abdominal/epigastric pain.   Patient in unable to provide history as she is sleeping.     Of note, she was recently admitted to [facility] last month and was diagnosed with multiple liver lesions which on biopsy showed adenocarcinoma possibly of biliary origin.  She was discharged home on hospice as per the daughter but it is unclear to the team if she was actually     Allergies:  No Known Allergies      Home Medications:   	traMADol: Last Dose Taken:      Hospital Medications:  lactulose Retention Enema 200 Gram(s) Rectal every 6 hours  morphine  - Injectable 1 milliGRAM(s) IV Push every 3 hours PRN  sodium chloride 0.9%. 1000 milliLiter(s) IV Continuous <Continuous>      PMHX/PSHX:  Metastatic adenocarcinoma  No pertinent past medical history  No significant past surgical history      Family history:  Family history of cancer (Sibling)      Social History:   denies smoking,     ROS:   unable to assess      PHYSICAL EXAM:     GENERAL:  Appears stated age  HEENT:  NC/AT  CHEST:  Full & symmetric excursion  HEART:  Regular rhythm, S1, S2  ABDOMEN:  Soft, distended+  EXTREMITIES:  no edema  SKIN:  No rash  NEURO:  drowsy and sleeping     Vital Signs:  Vital Signs Last 24 Hrs  T(C): 36.4 (14 Sep 2018 05:00), Max: 36.9 (13 Sep 2018 20:19)  T(F): 97.5 (14 Sep 2018 05:00), Max: 98.4 (13 Sep 2018 20:19)  HR: 100 (14 Sep 2018 10:40) (100 - 110)  BP: 135/68 (14 Sep 2018 10:40) (124/79 - 136/84)  BP(mean): --  RR: 18 (14 Sep 2018 10:40) (14 - 20)  SpO2: 98% (14 Sep 2018 10:40) (96% - 98%)  Daily Height in cm: 167.64 (13 Sep 2018 23:56)    Daily     LABS:                        8.9    17.20 )-----------( 285      ( 14 Sep 2018 07:40 )             27.4     09-14    126<L>  |  87<L>  |  50<H>  ----------------------------<  78  5.3   |  10<LL>  |  1.21    Ca    9.4      14 Sep 2018 07:40  Phos  3.7     09-14  Mg     2.5     09-14    TPro  4.6<L>  /  Alb  2.3<L>  /  TBili  32.0<H>  /  DBili  x   /  AST  443<H>  /  ALT  100<H>  /  AlkPhos  724<H>  09-14    LIVER FUNCTIONS - ( 14 Sep 2018 07:40 )  Alb: 2.3 g/dL / Pro: 4.6 g/dL / ALK PHOS: 724 u/L / ALT: 100 u/L / AST: 443 u/L / GGT: x           PT/INR - ( 13 Sep 2018 20:50 )   PT: 21.5 SEC;   INR: 1.85          PTT - ( 13 Sep 2018 20:50 )  PTT:40.8 SEC    Amylase Serum--      Lipase serum--       [id number]  Amylase Serum--      Lipase serum--       [id number]  Amylase Serum--      Lipase serum23.9       Ammonia--      Imaging:    < from: MR Abdomen w/ IV Cont (07.27.18 @ 09:48) >  FINDINGS:    LOWER CHEST: Normal.    LIVER: Noncirrhotic. No significant steatosis.   LESIONS: Bilobar T2 hyperintense, hypoenhancing, diffusion restricting lesions, many confluent with near replacement of the right lobe. Reference lesions as follows: --12.1 x 6.8 cm right lobe (series 16, image 22). --7.9 x 5.1 cm partially exophytic lateral left lobe (series 16, image 18).    VESSELS: Conventional hepatic arterial anatomy. Patent portal veins. Left   hepatic vein is mildly attenuated by a left lobe mass at the dome,   however, remains patent. Patent right and middle hepatic veins.    BILE DUCTS: Normal caliber.  GALLBLADDER: Cholelithiasis.  SPLEEN: Normal.  PANCREAS: Normal.  ADRENALS: 8 mm left adrenal adenoma.  KIDNEYS/URETERS: Subcentimeter bilateral cysts. No hydronephrosis.    VISUALIZED PORTIONS:    BOWEL: Within normal limits.   PERITONEUM: Trace perihepatic ascites.  RETROPERITONEUM: Prominent subcentimeter retroperitoneal and periportal lymph nodes with areference portacaval node measuring 0.8 cm in short   ABDOMINAL WALL: Within normal limits.  BONES: Multilevel degenerative change.    IMPRESSION:     1.  Noncirrhotic liver with numerous bilobar liver lesions, suspicious for diffuse tumor. Primary consideration is metastatic disease, though   primary liver malignancy cannot be entirely excluded. Patent portal and hepatic veins.  2.  Cholelithiasis.    < end of copied text >

## 2018-09-14 NOTE — PROGRESS NOTE ADULT - PROBLEM SELECTOR PLAN 4
-r/o SIADH sec.to malignancy.Metabolic acidosis-1/2 ND with hco3 per renal  -fluid restriction  -Renal consult appreciated

## 2018-09-14 NOTE — CONSULT NOTE ADULT - SUBJECTIVE AND OBJECTIVE BOX
HPI:  59 y/o F with recently diagnosed cancer (likely biliary with mets to liver) presents with abdominal/epigastric pain.  Pt had been admitted to [facility] last month and discovered to have multiple liver lesions which on biopsy showed adenocarcinoma possibly of biliary origin.  Pt was discharged home on tramadol for pain.  Following discharge, she was admitted to [facility] for continued pain.  During admission to [facility], pt and family were told that chemotherapy was not an option, and was discharged with nurse visiting once weekly.  When asked, daughter thinks that pt was placed on hospice care.  Daughter reports that over the past 3 days, pt has become more lethargic, mostly laying in bed, speaking minimally, and mostly keeping her eyes closed.  She has become very jaundiced, and daughter was told at [facility] last week that her bilirubin was very high.  She has had minimal PO intake, and has been constipated.  Daughter reports abdomen has been distended for several weeks.  This afternoon, pt complained to daughter of burning epigastric and chest pain for which daughter gave tramadol and cold water with minimal relief.      When asked, pt reports some pain and nausea, but does not answer further questions.      PERTINENT PM/SXH:   Metastatic adenocarcinoma  No pertinent past medical history    No significant past surgical history    SOCIAL HISTORY:   Significant other/partner:  [x ] YES  [ ] NO               Children:  [x ] YES  [ ] NO                   Congregational/Spirituality: Methodist/ [employer]ism   Substance hx:  [ ] YES   [x ] NO                   Tobacco hx:  [ ] YES  [x ] NO                       Alcohol hx: [ ] YES  [x ] NO         Home Opioid hx:  [ ] YES  [x ] NO   Living Situation: [x ] Home  [ ] Long term care  [ ] Rehab [ ] Other    FAMILY HISTORY:  Family history of cancer (Sibling): Brother with urinary tract malignancy at asge 65    [ ] Family history non-contributory     BASELINE (I)ADLs (prior to admission):  [facility]: [ ] total  [ ] moderate [ x] dependent    ADVANCE DIRECTIVES:    DNR Yes (x)    MOLST  [x ] YES [ ] NO                      [ x] Completed 9/14  Health Care Proxy [ ] YES  [ ] NO   [ ] Completed  Living Will  [ ] YES [ ] NO             [x ] Surrogate  [ ] HCP  [ ] Guardian:  Daughter Daxa Agrawal      Phone#: [phone]    Allergies  No Known Allergies  Intolerances    MEDICATIONS  (STANDING):  HYDROmorphone  Injectable 0.2 milliGRAM(s) IV Push every 8 hours  HYDROmorphone  Injectable 0.2 milliGRAM(s) IV Push once  lactulose Retention Enema 200 Gram(s) Rectal every 6 hours  sodium chloride 0.9%. 1000 milliLiter(s) (75 mL/Hr) IV Continuous <Continuous>    MEDICATIONS  (PRN):  HYDROmorphone  Injectable 0.2 milliGRAM(s) IV Push every 3 hours PRN moderate to severe pain  HYDROmorphone  Injectable 0.2 milliGRAM(s) IV Push every 3 hours PRN Dyspnea      PRESENT SYMPTOMS:  Source: [x ] Patient   [ x] Family   [ ] Team     Pain:                        [ ] No [x ] Yes             [ ] Mild [ ] Moderate [ ] Severe  Unable to assess exact location d/t poor mental status   Onset -  Location -  Duration -  Character -  Alleviating/Aggravating -  Radiation -  Timing -      Dyspnea:                [ ] No [x ] Yes             [ ] Mild [ x] Moderate [ ] Severe    Anxiety:                  [ ] No [ ] Yes             [ ] Mild [ ] Moderate [ ] Severe    Fatigue:                  [ ] No [ ] Yes             [ ] Mild [ ] Moderate [ ] Severe    Nausea:                  [ ] No [ ] Yes             [ ] Mild [ ] Moderate [ ] Severe    Loss of appetite:   [ ] No [ ] Yes             [ ] Mild [ ] Moderate [ ] Severe    Constipation:        [ ] No [ ] Yes             [ ] Mild [ ] Moderate [ ] Severe    Other Symptoms:  [ ] All other review of systems negative   [x ] Unable to obtain due to poor mentation     Karnofsky Performance Score/Palliative Performance Status Version 2:     20    %    PHYSICAL EXAM:  Vital Signs Last 24 Hrs  T(C): 36.4 (14 Sep 2018 05:00), Max: 36.9 (13 Sep 2018 20:19)  T(F): 97.5 (14 Sep 2018 05:00), Max: 98.4 (13 Sep 2018 20:19)  HR: 104 (14 Sep 2018 13:54) (100 - 110)  BP: 130/76 (14 Sep 2018 13:54) (124/79 - 136/84)  BP(mean): --  RR: 17 (14 Sep 2018 13:54) (14 - 20)  SpO2: 98% (14 Sep 2018 13:54) (96% - 98%) I&O's Summary      General:  [ ] Alert  [ ] Oriented x      [x ] Lethargic  [ ] Agitated   [ ] Cachexia   [ ] Unarousable  [ ] Verbal  [x ] Non-Verbal    HEENT:  [ ] Normal   [x ] Dry mouth   [ ] ET Tube    [ ] Trach  [ ] Oral lesions    Lungs:   [x ] Clear [x ] Tachypnea  [ ] Audible excessive secretions   [ ] Rhonchi        [ ] Right [ ] Left [ ] Bilateral  [ ] Crackles        [ ] Right [ ] Left [ ] Bilateral  [ ] Wheezing     [ ] Right [ ] Left [ ] Bilateral    Cardiovascular:  [x ] Regular [ ] Irregular [ ] Tachycardia   [ ] Bradycardia  [ ] Murmur [ ] Other    Abdomen: [ ] Soft  [x ] Distended   [ ] +BS  [ ] Non tender [ ] Tender  [ ]PEG   [ ]OGT/ NGT   Last BM:  9/14     Genitourinary: [ ] Normal [x ] Incontinent   [ ] Oliguria/Anuria   [ ] Devine    Musculoskeletal:  [ ] Normal   [ ] Weakness  [x ] Bedbound/Wheelchair bound [ ] Edema    Neurological: [ ] No focal deficits  [x ] Cognitive impairment  [ ] Dysphagia [ ] Dysarthria [ ] Paresis [ ] Other     Skin: [ ] Normal   [ ] Pressure ulcer(s)   [ ] Rash (x) Jaundice     LABS:                        8.9    17.20 )-----------( 285      ( 14 Sep 2018 07:40 )             27.4     09-14    126<L>  |  87<L>  |  50<H>  ----------------------------<  78  5.3   |  10<LL>  |  1.21    Ca    9.4      14 Sep 2018 07:40  Phos  3.7     09-14  Mg     2.5     09-14    TPro  4.6<L>  /  Alb  2.3<L>  /  TBili  32.0<H>  /  DBili  x   /  AST  443<H>  /  ALT  100<H>  /  AlkPhos  724<H>  09-14    PT/INR - ( 13 Sep 2018 20:50 )   PT: 21.5 SEC;   INR: 1.85          PTT - ( 13 Sep 2018 20:50 )  PTT:40.8 SEC      Shock: [ ] Septic [ ] Cardiogenic [ ] Neurologic [ ] Hypovolemic  Vasopressors x   Inotrophs x     Protein Calorie Malnutrition: [ ] Mild [ ] Moderate [x ] Severe    Oral Intake: [x ] Unable/mouth care only [ ] Minimal [ ] Moderate [ ] Full Capability  Diet: [x ] NPO [ ] Tube feeds [ ] TPN [ ] Other     RADIOLOGY & ADDITIONAL STUDIES: no new imaging for review     REFERRALS:   [ ] Chaplaincy  [ x] Hospice  [ ] Child Life  [ ] Social Work  [ ] Case management [ ] Holistic Therapy

## 2018-09-14 NOTE — PATIENT PROFILE ADULT. - LANGUAGE ASSISTANCE NEEDED
Yes-Patient/Caregiver accepts free interpretation services.../Patient does not want to use interprature at this time, daughter at bedside and able to translate

## 2018-09-14 NOTE — CONSULT NOTE ADULT - PROBLEM SELECTOR RECOMMENDATION 3
Sec to acidosis  Low K diet  monitor K level  HCO3 as above
Daughter understands that patient may have an infection such as SBP  She wishes to pursue a limited trial of IV abx.   She understands that this may not change the patients overall clinical picture and would not want further abx after the trial.   C/w ID reccs; c/w diagnostic evaluation of paracentesis if tap is possible

## 2018-09-14 NOTE — CONSULT NOTE ADULT - ASSESSMENT
IMPRESSION  - Abdominal pain in the setting of elevated bilirubin, likely 2/2 metastatic cancer  - Leukocytosis, could be 2.2 cholangitis vs other source, need infectious workup with CXR, UA and blood cultures     RECOMMENDATION    - trend CBC, CMP  - recommend antibiotics   - can consider for biliary stent placement, but currently Na 126 and bicarb 10, thus at this time patient is very sick to tolerate the procedure, risks of procedure outweigh the benefits  - recommend palliative consult   - supportive care as per primary team    Nahed Coe, PGY-5  GI fellow  B- [id number]/ [phone]  Please call GI fellow on call after 5pm and on weekends
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 Pt's course with progressive decline in functional status, and now with marked hyperbilirubinemia. ID consulted for new-onset leukocytosis.
59 y/o F with recently diagnosed cancer (likely biliary with mets to liver) presents with abdominal/epigastric pain. Found to have electrolyte abnormality
Patient is a 59 y/o F w/ significant h/o recent diagnosis of metastatic adenocarcinoma w/ likely billary origin. Patient was recently diagnosed via biopsy 8/1 at [facility]. Patients functional status declined which made patient ineligible for chemo therapy which was being offered by oncology from [facility].  Palliative consulted for GOC.

## 2018-09-14 NOTE — CONSULT NOTE ADULT - PROBLEM SELECTOR RECOMMENDATION 6
Palliative philosophy introduced  DNR/DNI  Limited trial of antibiotics  Hospice referral made   Palliative to follow for symptomatic care

## 2018-09-14 NOTE — CHART NOTE - NSCHARTNOTEFT_GEN_A_CORE
Discussed with IR regarding patient, as per IR imaging required prior to paracentesis. Ordered for Abdominal US.

## 2018-09-14 NOTE — CHART NOTE - NSCHARTNOTEFT_GEN_A_CORE
Consulted IR team regarding possible diagnostic and therapeutic paracentesis. IR physician reported he will talk to his attending regarding possible ultrasound to confirm ascites and paracentesis. Will follow up recs.

## 2018-09-15 NOTE — PROGRESS NOTE ADULT - SUBJECTIVE AND OBJECTIVE BOX
CESAR SIMS  58y  Female      Patient is a 58y old  Female who presents with a chief complaint of Abdominal pain (15 Sep 2018 15:30)  Patient is declining,poorly responsive,dyspneic,no fever    REVIEW OF SYSTEMS:  -Unable to obtain sec.to patient's Mental status      INTERVAL HPI/OVERNIGHT EVENTS:  T(C): 36.8 (09-15-18 @ 05:44), Max: 36.8 (09-15-18 @ 05:44)  HR: 96 (09-15-18 @ 05:44) (96 - 99)  BP: 102/53 (09-15-18 @ 05:44) (102/53 - 105/72)  RR: 17 (09-15-18 @ 14:13) (17 - 18)  SpO2: 96% (09-15-18 @ 05:44) (96% - 96%)  Wt(kg): --  I&O's Summary    T(C): 36.8 (09-15-18 @ 05:44), Max: 36.8 (09-15-18 @ 05:44)  HR: 96 (09-15-18 @ 05:44) (96 - 99)  BP: 102/53 (09-15-18 @ 05:44) (102/53 - 105/72)  RR: 17 (09-15-18 @ 14:13) (17 - 18)  SpO2: 96% (09-15-18 @ 05:44) (96% - 96%)  Wt(kg): --Vital Signs Last 24 Hrs  T(C): 36.8 (15 Sep 2018 05:44), Max: 36.8 (15 Sep 2018 05:44)  T(F): 98.3 (15 Sep 2018 05:44), Max: 98.3 (15 Sep 2018 05:44)  HR: 96 (15 Sep 2018 05:44) (96 - 99)  BP: 102/53 (15 Sep 2018 05:44) (102/53 - 105/72)  BP(mean): --  RR: 17 (15 Sep 2018 14:13) (17 - 18)  SpO2: 96% (15 Sep 2018 05:44) (96% - 96%)    LABS:                        8.4    18.58 )-----------( 285      ( 15 Sep 2018 06:00 )             27.1     09-15    128<L>  |  84<L>  |  62<H>  ----------------------------<  76  6.1<H>   |  8<LL>  |  1.77<H>    Ca    8.7      15 Sep 2018 06:00  Phos  6.5     09-15  Mg     2.9     09-15    TPro  4.4<L>  /  Alb  2.2<L>  /  TBili  32.5<H>  /  DBili  x   /  AST  791<H>  /  ALT  145<H>  /  AlkPhos  744<H>  09-15    PT/INR - ( 13 Sep 2018 20:50 )   PT: 21.5 SEC;   INR: 1.85          PTT - ( 13 Sep 2018 20:50 )  PTT:40.8 SEC    CAPILLARY BLOOD GLUCOSE      POCT Blood Glucose.: 107 mg/dL (15 Sep 2018 11:03)  POCT Blood Glucose.: 121 mg/dL (15 Sep 2018 06:44)  POCT Blood Glucose.: 103 mg/dL (15 Sep 2018 02:04)  POCT Blood Glucose.: 116 mg/dL (14 Sep 2018 22:12)  POCT Blood Glucose.: 83 mg/dL (14 Sep 2018 18:10)            PAST MEDICAL & SURGICAL HISTORY:  Metastatic adenocarcinoma  No significant past surgical history      MEDICATIONS  (STANDING):  HYDROmorphone  Injectable 0.2 milliGRAM(s) IV Push every 8 hours  lactulose Retention Enema 200 Gram(s) Rectal every 6 hours  piperacillin/tazobactam IVPB. 3.375 Gram(s) IV Intermittent every 12 hours  sodium bicarbonate  Infusion 0.086 mEq/kG/Hr (75 mL/Hr) IV Continuous <Continuous>  vancomycin  IVPB 750 milliGRAM(s) IV Intermittent every 12 hours    MEDICATIONS  (PRN):  HYDROmorphone  Injectable 0.2 milliGRAM(s) IV Push every 3 hours PRN moderate to severe pain  HYDROmorphone  Injectable 0.2 milliGRAM(s) IV Push every 3 hours PRN Dyspnea        RADIOLOGY & ADDITIONAL TESTS:    Imaging Personally Reviewed:  [ ] YES  [ ] NO    Consultant(s) Notes Reviewed:  [x ] YES  [ ] NO    PHYSICAL EXAM:  GENERAL: very minimally responsive,Jaundiced  HEAD:  Atraumatic, Normocephalic  NECK: Supple, No JVD, Normal thyroid  NERVOUS SYSTEM:  disoriented  CHEST/LUNG: Decreased Breath sounds,  HEART: regular,tachy  ABDOMEN: Distended,decreased bowel sounds  EXTREMITIES: edema 2+      Care Discussed with Consultants/Other Providers [x ] YES  [ ] NO      Code Status: [] Full Code [] DNR [] DNI [] Goals of Care:   Disposition: [] ICU [] Stroke Unit [] RCU []PCU []Floor [] Discharge Home         GEOFFREY Dave.FACP

## 2018-09-15 NOTE — PROGRESS NOTE ADULT - ASSESSMENT
1.	Anion Gap Metabolic Acidosis, likely Lactic Acidosis of malignancy. This has a poor outcome.  2.	Hyperkalemia from MORGAN and Acidosis.  3.	MORGAN with lactic acisosis.  4.	Hyponatremia.    PLAN:  1.	Comfort care.  2.	Hold blood draws.

## 2018-09-15 NOTE — PROGRESS NOTE ADULT - SUBJECTIVE AND OBJECTIVE BOX
CESAR SIMS    HPI:  This is a patient who has Metastatic Ca to the liver, now followed for Hyponatremia and MORGAN. She is very lethargic and not answering questions.    HEALTH ISSUES - PROBLEM Dx:  Hyperkalemia: Hyperkalemia  Acidosis: Acidosis  Encounter for palliative care: Encounter for palliative care  Dyspnea: Dyspnea  Leukocytosis: Leukocytosis  Advanced care planning/counseling discussion: Advanced care planning/counseling discussion  Hyponatremia: Hyponatremia  Encephalopathy: Encephalopathy  Metastatic adenocarcinoma: Metastatic adenocarcinoma  Abdominal pain, unspecified abdominal location: Abdominal pain, unspecified abdominal location        MEDICATIONS  (STANDING):  HYDROmorphone  Injectable 0.2 milliGRAM(s) IV Push every 8 hours  lactulose Retention Enema 200 Gram(s) Rectal every 6 hours  piperacillin/tazobactam IVPB. 3.375 Gram(s) IV Intermittent every 12 hours  sodium bicarbonate  Infusion 0.086 mEq/kG/Hr (75 mL/Hr) IV Continuous <Continuous>  vancomycin  IVPB 750 milliGRAM(s) IV Intermittent every 12 hours    MEDICATIONS  (PRN):  HYDROmorphone  Injectable 0.2 milliGRAM(s) IV Push every 3 hours PRN moderate to severe pain  HYDROmorphone  Injectable 0.2 milliGRAM(s) IV Push every 3 hours PRN Dyspnea    Vital Signs Last 24 Hrs  T(C): 36.8 (15 Sep 2018 05:44), Max: 36.8 (15 Sep 2018 05:44)  T(F): 98.3 (15 Sep 2018 05:44), Max: 98.3 (15 Sep 2018 05:44)  HR: 96 (15 Sep 2018 05:44) (96 - 99)  BP: 102/53 (15 Sep 2018 05:44) (102/53 - 105/72)  BP(mean): --  RR: 17 (15 Sep 2018 14:13) (17 - 18)  SpO2: 96% (15 Sep 2018 05:44) (96% - 96%)  Daily     Daily     PHYSICAL EXAM:  Constitutional:  Dyspneic    Neck:  The thyroid is normal. Trachea is midline.     Respiratory: The lungs are clear to auscultation. No dullness and expansion is normal.    Cardiovascular: S1 and S2 are normal. No mummurs, rubs or gallops are present.    Gastrointestinal: The abdomen is distended.    Genitourinary: The bladder is not distended. No CVA tenderness is present.    Extremities: No edema is noted. No deformities are present.    Neurological: Very lethargic    Skin: Deeply icteric                        8.4    18.58 )-----------( 285      ( 15 Sep 2018 06:00 )             27.1     09-15    128<L>  |  84<L>  |  62<H>  ----------------------------<  76  6.1<H>   |  8<LL>  |  1.77<H>    Ca    8.7      15 Sep 2018 06:00  Phos  6.5     09-15  Mg     2.9     09-15    TPro  4.4<L>  /  Alb  2.2<L>  /  TBili  32.5<H>  /  DBili  x   /  AST  791<H>  /  ALT  145<H>  /  AlkPhos  744<H>  09-15

## 2018-09-15 NOTE — PROGRESS NOTE ADULT - PROBLEM SELECTOR PLAN 4
-r/o SIADH sec.to malignancy.Metabolic acidosis-anion gap-1/2 ND with hco3 per renal.Hyperkalemia sec.to above  -fluid restriction  -Renal f/u noted.

## 2018-09-15 NOTE — PROGRESS NOTE ADULT - ASSESSMENT
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 Pt's course with progressive decline in functional status, and now with marked hyperbilirubinemia. ID consulted for new-onset leukocytosis.    Abdominal pain and ascites in 58 F with newly diagnosed metastatic adenocarcinoma, most likely biliary in origin.   Patient is lethargic, jaundiced and not responding to verbal stimuli.  Afebrile overnight but leukocytosis slightly uptrending  - Neutrophil-predominant leukocytosis with left shift  - DDx: SBP vs cholangitis vs leukemoid reaction 2/2 non-hematologic malignancy  - Empiric treatment with IV zosyn and vancomycin-would adjust vanco to 1g IV daily (crcl 37)  -Check trough prior to 4th dose  -f/u blood cx  -check urine and urine culture (will require straight cath), CT abdomen/pelvis  - If ascites is present, can consider paracentesis which at this point would be mainly for palliative reasons  -Clarify GOC 58F w metastatic adenocarcinoma to liver likely primary biliary p/w abd pain. Pt's course with progressive decline in functional status, and now with marked hyperbilirubinemia.  Marked leukocytosis continues. BC are pending.  Doing very poorly with markedly abnormal liver tests.  Though I doubt she is infected, I agree with empiric antibiotics.  MORGAN.    Suggest:  - zosyn should change q12  - please check vancomycin trough and may need to dose by level with MORGAN  - f/u all cultures    poor prognosis    Please call the ID service [phone] with questions or concerns over the weekend    above d/[place] [phone]

## 2018-09-15 NOTE — CHART NOTE - NSCHARTNOTEFT_GEN_A_CORE
Spoke with patient's dgtr (Ms. Agrawal @ [phone]). Explained patient's continued decline. Pt's dgtr verbalized understanding. Discussed overall goals for her mother - dgtr would like her mom to be comfortable (pt already has standing orders for pain and dyspnea). Pt appears without pain or dyspnea when examined at bedside. Dgtr informed of EKG changes (inverted T wavesin V2 and V3 - possibly 2/2 electrolyte abnormality - pt s/p electrolyte correction). Dgtr does not want aggressive measures - will not monitor on tele as will not change overall prognosis. Dgtr in agreement for stopping blood draws. Dgtr would like to continue with abx at this time. Vitals BID. Dgtr will be at bedside this evening with pt's father who is flying in from [place]. Will re-discuss pt's clinical status/poor prognosis at that time. Med attg aware of above and in agreement.

## 2018-09-15 NOTE — PROGRESS NOTE ADULT - SUBJECTIVE AND OBJECTIVE BOX
Follow Up:  metastatic adenocarcinoma to liver with likely biliary primary, leukocytosis unclear cause    Interval History/ROS:    Allergies  No Known Allergies        ANTIMICROBIALS:  piperacillin/tazobactam IVPB. 3.375 every 8 hours  vancomycin  IVPB 750 every 12 hours      OTHER MEDS:  MEDICATIONS  (STANDING):  HYDROmorphone  Injectable 0.2 every 8 hours  HYDROmorphone  Injectable 0.2 every 3 hours PRN  HYDROmorphone  Injectable 0.2 every 3 hours PRN  lactulose Retention Enema 200 every 6 hours      Vital Signs Last 24 Hrs  T(C): 36.8 (15 Sep 2018 05:44), Max: 36.8 (15 Sep 2018 05:44)  T(F): 98.3 (15 Sep 2018 05:44), Max: 98.3 (15 Sep 2018 05:44)  HR: 96 (15 Sep 2018 05:44) (96 - 104)  BP: 102/53 (15 Sep 2018 05:44) (102/53 - 135/68)  BP(mean): --  RR: 18 (15 Sep 2018 05:44) (17 - 18)  SpO2: 96% (15 Sep 2018 05:44) (96% - 98%)    PHYSICAL EXAM:  General: agonal breathing, +pallor + jaundice  HEAD/EYES: anicteric, PERRL  ENT:  supple  Cardiovascular:   S1, S2 tachycardic  Respiratory:  clear bilaterally  GI:  soft, distended, + ascites decreased bowel sounds bowel sounds  :  no CVA tenderness   Musculoskeletal:  no synovitis  Neurologic:  grossly non-focal  Skin:  + Le edema, cool extremities  Lymph: no lymphadenopathy  Psychiatric:  not assessed  Vascular:  no phlebitis                                8.4    18.58 )-----------( 285      ( 15 Sep 2018 06:00 )             27.1       09-15    128<L>  |  84<L>  |  62<H>  ----------------------------<  76  6.1<H>   |  8<LL>  |  1.77<H>    Ca    8.7      15 Sep 2018 06:00  Phos  6.5     09-15  Mg     2.9     09-15    TPro  4.4<L>  /  Alb  2.2<L>  /  TBili  32.5<H>  /  DBili  x   /  AST  791<H>  /  ALT  145<H>  /  AlkPhos  744<H>  09-15          MICROBIOLOGY:  v            RADIOLOGY: Follow Up:  metastatic adenocarcinoma to liver with likely biliary primary, leukocytosis unclear cause    Interval History/ROS:  patient is non-verbal and lethargic.  tachypneic. Unable to obtain ROS - non-verbal    Allergies  No Known Allergies      ANTIMICROBIALS:    piperacillin/tazobactam IVPB. 3.375 every 8 hours  vancomycin  IVPB 750 every 12 hours    OTHER MEDS:  MEDICATIONS  (STANDING):  HYDROmorphone  Injectable 0.2 every 8 hours  HYDROmorphone  Injectable 0.2 every 3 hours PRN  HYDROmorphone  Injectable 0.2 every 3 hours PRN  lactulose Retention Enema 200 every 6 hours    Vital Signs Last 24 Hrs  T(C): 36.8 (15 Sep 2018 05:44), Max: 36.8 (15 Sep 2018 05:44)  T(F): 98.3 (15 Sep 2018 05:44), Max: 98.3 (15 Sep 2018 05:44)  HR: 96 (15 Sep 2018 05:44) (96 - 104)  BP: 102/53 (15 Sep 2018 05:44) (102/53 - 135/68)  BP(mean): --  RR: 18 (15 Sep 2018 05:44) (17 - 18)  SpO2: 96% (15 Sep 2018 05:44) (96% - 98%)    PHYSICAL EXAM:  General: agonal breathing, +pallor + jaundice  HEAD/EYES: icteric  ENT:  supple  Cardiovascular:   S1, S2 tachycardic  Respiratory:  clear bilaterally  GI:  soft, distended, + ascites decreased bowel sounds bowel sounds  :  no chacko   Musculoskeletal:  no synovitis  Neurologic:  lethargic  Skin:  + Le edema, cool extremities  Psychiatric:  lethargic  Vascular:  no phlebitis    WBC Count: 18.58 (09-15-18 @ 06:00)  WBC Count: 17.20 (09-14-18 @ 07:40)  WBC Count: 18.43 (09-14-18 @ 00:25)  WBC Count: 18.20 (09-13-18 @ 20:50)                 8.4    18.58 )-----------( 285      ( 15 Sep 2018 06:00 )             27.1     128<L>  |  84<L>  |  62<H>  ----------------------------<  76  6.1<H>   |  8<LL>  |  1.77<H>    Ca    8.7      15 Sep 2018 06:00  Phos  6.5     09-15  Mg     2.9     09-15    TPro  4.4<L>  /  Alb  2.2<L>  /  TBili  32.5<H>  /  DBili  x   /  AST  791<H>  /  ALT  145<H>  /  AlkPhos  744<H>  09-15    Bilirubin Total, Serum: 32.5 mg/dL (09-15-18 @ 06:00)  Bilirubin Total, Serum: 32.0 mg/dL (09-14-18 @ 07:40)  Bilirubin Total, Serum: 31.7 mg/dL (09-14-18 @ 00:25)  Bilirubin Total, Serum: 31.3 mg/dL (09-13-18 @ 20:50)    MICROBIOLOGY:  9/14 BC pending    RADIOLOGY:  MR Abdomen w/ IV Cont (07.27.18 @ 09:48) >  IMPRESSION:    1.  Noncirrhotic liver with numerous bilobar liver lesions, suspicious for diffuse tumor. Primary consideration is metastatic disease, though primary liver malignancy cannot be entirely excluded. Patent portal and hepatic veins.  2.  Cholelithiasis.

## 2018-09-15 NOTE — PROGRESS NOTE ADULT - ATTENDING COMMENTS
-M  -Palliative consult appreciated.Pain management,favour comfort measures  -Avoid blood draws  -Family is aware of poor prognosis.d/w Daughter at bedside.Answered all questions  -DNR/DNI.Palliative care f/u  -D/W STAFF

## 2018-09-16 NOTE — CHART NOTE - NSCHARTNOTEFT_GEN_A_CORE
Notified by RN that pt has no pulse. Pt is DNR/DNI - comfort measures with trial of abx. No palpable radial or carotid pulse. No spontaneous respirations. No heart or lung sounds on auscultation. Pupils fixed and dilated. Pronounced death @ 07:27 am on 9/16/18. Family aware (cousin at bedside - Vlad). Pt's dgtr in route to hospital. Notified by RN that pt has no pulse. Pt is DNR/DNI - comfort measures with trial of abx. No palpable radial or carotid pulse. No spontaneous respirations. No heart or lung sounds on auscultation. Pupils fixed and dilated. Pronounced death @ 07:27 am on 9/16/18. Family aware (cousin at bedside - Vlad). Dgtr in route to hospital. Dgtr seen at bedside (visibly upset). Family declined autopsy. Med attg aware.

## 2018-09-16 NOTE — DISCHARGE NOTE FOR THE EXPIRED PATIENT - HOSPITAL COURSE
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  Pt's course with progressive decline in functional status, and now with marked hyperbilirubinemia.       Abdominal pain, unspecified abdominal location.    -Likely 2/2 metastatic adenocarcinoma.   -Palliative care consulted  -Morphine for pain control    Metastatic adenocarcinoma.    - per onc at [facility], not a candidate for chemotherapy  - Palliative care consulted - DNR/DNI comfort care with trial of abx    Encephalopathy.    -Possibly infection or hepatic encephalopathy  -S/p lactulose  -ID consulted  -Empiric vancomycin and zosyn    Hyponatremia.    -r/o SIADH 2/2 malignancy  -Nephrology consulted  -Sodium bicarb infusion  -Fluid restriction     Hyperkalemia  -S/p Kayexalate   -S/p hyperkalemia cocktail   -EKG changes - no intervention 2/2 [facility] discussion    Advanced care planning/counseling discussion.    -Palliative care consulted  -Hospice was consulted - planned for family meeting on   -DNR/DNI - comfort measures with trial of abx  -Dgtr does not want any aggressive tx or testing     on 18 @ 07:27. Family at bedside (cousin Vlad). Med attg aware. 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  Pt's course with progressive decline in functional status, and now with marked hyperbilirubinemia.       Abdominal pain, unspecified abdominal location.    -Likely 2/2 metastatic adenocarcinoma.   -Palliative care consulted  -Morphine for pain control    Metastatic adenocarcinoma.    - per onc at [facility], not a candidate for chemotherapy  - Palliative care consulted - DNR/DNI comfort care with trial of abx    Encephalopathy.    -Possibly infection or hepatic encephalopathy  -S/p lactulose  -ID consulted  -Bcx NTD  -Empiric vancomycin and zosyn    Hyponatremia.    -r/o SIADH 2/2 malignancy  -Nephrology consulted  -Sodium bicarb infusion  -Fluid restriction     Hyperkalemia  -S/p Kayexalate   -S/p hyperkalemia cocktail   -EKG changes - no intervention 2/2 [facility] discussion    Advanced care planning/counseling discussion.    -Palliative care consulted  -Hospice was consulted - planned for family meeting on   -DNR/DNI - comfort measures with trial of abx  -Dgtr does not want any aggressive tx or testing     on 18 @ 07:27. Family at bedside (cousin Vlad). Med attg aware. 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  Pt's course with progressive decline in functional status, and now with marked hyperbilirubinemia.       Abdominal pain, unspecified abdominal location.    -Likely 2/2 metastatic adenocarcinoma.   -Palliative care consulted  -Morphine for pain control    Metastatic adenocarcinoma.    - per onc at [facility], not a candidate for chemotherapy  - Palliative care consulted - DNR/DNI comfort care with trial of abx    Encephalopathy.    -Possibly infection or hepatic encephalopathy  -S/p lactulose  -ID consulted  -Bcx NTD  -Empiric vancomycin and zosyn    Hyponatremia.    -r/o SIADH 2/2 malignancy  -Nephrology consulted  -Sodium bicarb infusion  -Fluid restriction     Hyperkalemia  -S/p Kayexalate   -S/p hyperkalemia cocktail   -EKG changes - no intervention 2/2 [facility] discussion    Advanced care planning/counseling discussion.    -Palliative care consulted  -Hospice was consulted - planned for family meeting on   -DNR/DNI - comfort measures with trial of abx  -Dgtr does not want any aggressive tx or testing     on 18 @ 07:27. Family at bedside (cousin Vlad). Dgtr seen at bedside (visibly upset). Family declined autopsy. Med attg aware.

## 2018-09-19 LAB
BACTERIA BLD CULT: SIGNIFICANT CHANGE UP
BACTERIA BLD CULT: SIGNIFICANT CHANGE UP

## 2019-10-18 NOTE — ED CDU PROVIDER SUBSEQUENT DAY NOTE - CROS ED CARDIOVAS ALL NEG
Carlita Tee is a 26 year old female seen at the request of Dr. Gen Peters MD with   Chief Complaint   Patient presents with   • Throat Problem       HPI:  Patient is here to be evaluated for chronic tonsil problems. She reports difficulties with her tonsils since elementary school. When the patient was in third grade she had a mono infection and had led to chronic throat symptoms for many months. She is continued to get recurring strep tonsillitis chronic sore throats and even flareups of mono in the past. Despite getting older and being done with schooling she continues to have these chronic flareups. She reports just a recent severe flareup of her throat. She is had difficulties with swallowing and pain with swallowing. She has been dehydrated in the past because of her sore throats requiring IV fluids. She reports at least 4-5 bouts of tonsillitis a year for the last 15 years. She is currently working as a  in a clean work environment. She is told that she snores on a nightly basis she feels that she gets very poor sleep. This likely contributed to some of her chronic insomnia problems and may also contribute to her attention deficit disorder. She's here to discuss definitive treatment of removal as she is tried conservative measures such as gargles as well as treatment with antibiotics and continues to have these recurring problems.    I have reviewed the patient's medications and allergies, past medical and surgical history, updating these as appropriate.      ROS:  General:  Increased daytime fatigue  No Fevers or chills.  Patient denies any chronic headaches    Social History:  Patient is currently working as a          BLEEDING HISTORY:  None reported    ANESTHESIA HISTORY:  No known adverse events    PHYSICAL EXAM:    VITALS:   Visit Vitals  Wt 110.6 kg   LMP 10/03/2019   Breastfeeding? No   BMI 36.01 kg/m²       GENERAL:   Appearance:  Patient is a pleasant 26 year  old female.   Status:  In no acute distress.  Communication:  Speech is fluid.  Voice is normal.  Affect:  Affect is normal.      HEAD/FACE:  Head is normal.  Lesions and scars are not present.   Skin:  Normal skin color, normal skin turgor.    CRANIAL NERVES:   Cranial nerves grossly intact.    NECK:  Mobility:  Neck is normal  Palpation:  No palpable adenopathies    EARS:   Right  Auricle:  Normal  External canal:  No obstructing cerumen  Tympanic membrane:  Intact and mobile       Left  Auricle:  Normal  External canal:  No obstructing cerumen  Tympanic membrane:  Intact and mobile        NOSE:  External:  Normal  Septum:  Fairly midline    Turbinates:  Moderate inflammation suggestive of some allergic component    ORAL CAVITY:  Lips:  Lips are normal    Teeth:  Dentition is normal for age  Gingivae:  Gingivae are normal  Tongue:  Anterior tongue is normal  Floor of Mouth:  Floor of mouth is normal  Palate:  Palate is normal   Mucosa:  Mucosa is normal    OROPHARYNX:  Mucosa:  Mucosa is normal  BOT:  Tongue base is normal   Tonsils:  3-1/2+ is cryptic tonsils with debris seen in the tonsil crypts no signs of active acute exudative tonsillitis     IMPRESSION:   Chronic tonsillitis with cryptic changes  Tonsil hypertrophy with obstructive breathing     PLAN:   I discussed the findings the patient and definitely recommend tonsillectomy possible adenoidectomy for these long-standing problems for 15 years as well as the hypertrophy and obstructive symptoms. I feel that a lot of her symptoms such as insomnia and attention deficit are from poor sleep. In addition she is had multiple antibiotics and multiple recurring infections and even admissions for IV fluids. She is failed all conservative measures and I have recommended tonsillectomy and adenoidectomy.I have gone over the alternatives risks and benefits and potential complications associated with tonsillectomy and adenoidectomy.  This includes but is not limited to:  The risk of bleeding postoperatively, risk of scarring of the pharynx and potential for velopharyngeal insufficiency, risks of altered taste and tongue compression.  As well as the risks associated with a general anesthetic.  The patient understands and wishes to proceed.         negative...

## 2020-10-07 NOTE — CONSULT NOTE ADULT - PROVIDER SPECIALTY LIST ADULT
Medicare Wellness Visit  Plan for Preventive Care    A good way for you to stay healthy is to use preventive care.  Medicare covers many services that can help you stay healthy.* The goal of these services is to find any health problems as quickly as possible. Finding problems early can help make them easier to treat.  Your personal plan below lists the services you may need and when they are due.     Health Maintenance Summary     Hepatitis C Screening (Once)  Overdue since 10/14/2004    Lung Cancer Screening (Yearly)  Overdue since 10/14/2008    Abdominal Aortic Aneurysm (AAA) Screening (Once)  Overdue since 10/14/2018    Pneumococcal Vaccine 65+ (2 of 2 - PPSV23)  Order placed this encounter    Medicare Wellness Visit (Yearly)  Overdue since 12/12/2019    Shingles Vaccine (2 of 2)  Overdue since 4/27/2020    Influenza Vaccine (1)  Order placed this encounter    Depression Screening (Yearly)  Next due on 10/7/2021    DTaP/Tdap/Td Vaccine (3 - Td)  Next due on 7/12/2022    Colorectal Cancer Screening-Colonoscopy (Every 10 Years)  Next due on 3/10/2025    Meningococcal Vaccine   Aged Out    HPV Vaccine   Aged Out           Preventive Care for Women and Men    Heart Screenings (Cardiovascular):  · Blood tests are used to check your cholesterol, lipid and triglyceride levels. High levels can increase your risk for heart disease and stroke. High levels can be treated with medications, diet and exercise. Lowering your levels can help keep your heart and blood vessels healthy.  Your provider will order these tests if they are needed.    · An ultrasound is done to see if you have an abdominal aortic aneurysm (AAA).  This is an enlargement of one of the main blood vessels that delivers blood to the body.   In the [place], 9,000 deaths are caused by AAA.  You may not even know you have this problem and as many as 1 in 3 people will have a serious problem if it is not treated.  Early diagnosis allows for more 
Gastroenterology
effective treatment and cure.  If you have a family history of AAA or are a male age 65-75 who has smoked, you are at higher risk of an AAA.  Your provider can order this test, if needed.    Colorectal Screening:  · There are many tests that are used to check for cancer of your colon and rectum. You and your provider should discuss what test is best for you and when to have it done.  Options include:  · Screening Colonoscopy: exam of the entire colon, seen through a flexible lighted tube.  · Flexible Sigmoidoscopy: exam of the last third (sigmoid portion) of the colon and rectum, seen through a flexible lighted tube.  · Cologuard DNA stool test: a sample of your stool is used to screen for cancer and unseen blood in your stool.  · Fecal Occult Blood Test: a sample of your stool is studied to find any unseen blood    Flu Shot:  · An immunization that helps to prevent influenza (the flu). You should get this every year. The best time to get the shot is in the fall.    Pneumococcal Shot:  • Vaccines are available that can help prevent pneumococcal disease, which is any type of infection caused by Streptococcus pneumoniae bacteria.   Their use can prevent some cases of pneumonia, meningitis, and sepsis. There are two types of pneumococcal vaccines:   o Conjugate vaccines (PCV-13 or Prevnar 13®) - helps protect against the 13 types of pneumococcal bacteria that are the most common causes of serious infections in children and adults.    o Polysaccharide vaccine (PPSV23 or Iapyohhrj97®) - helps protect against 23 types of pneumococcal bacteria for patients who are recommended to get it.  These vaccines should be given at least 12 months apart.  A booster is usually not needed.     Hepatitis B Shot:  · An immunization that helps to protect people from getting Hepatitis B. Hepatitis B is a virus that spreads through contact with infected blood or body fluids. Many people with the virus do not have symptoms.  The virus can 
Heme/Onc
lead to serious problems, such as liver disease. Some people are at higher risk than others. Your doctor will tell you if you need this shot.     Diabetes Screening:  · A test to measure sugar (glucose) in your blood is called a fasting blood sugar. Fasting means you cannot have food or drink for at least 8 hours before the test. This test can detect diabetes long before you may notice symptoms.    Glaucoma Screening:  · Glaucoma screening is performed by your eye doctor. The test measures the fluid pressure inside your eyes to determine if you have glaucoma.     Hepatitis C Screening:  · A blood test to see if you have the hepatitis C virus.  Hepatitis C attacks the liver and is a major cause of chronic liver disease.  Medicare will cover a single screening for all adults born between 1945 & 1965, or high risk patients (people who have injected illegal drugs or people who have had blood transfusions).  High risk patients who continue to inject illegal drugs can be screened for Hepatitis C every year.    Smoking and Tobacco-Use Cessation Counseling:  · Tobacco is the single greatest cause of disease and early death in our country today. Medication and counseling together can increase a person’s chance of quitting for good.   · Medicare covers two quitting attempts per year, with four counseling sessions per attempt (eight sessions in a 12 month period)    Preventive Screening tests for Women    Screening Mammograms and Breast Exams:  · An x-ray of your breasts to check for breast cancer before you or your doctor may be able to feel it.  If breast cancer is found early it can usually be treated with success.    Pelvic Exams and Pap Tests:  · An exam to check for cervical and vaginal cancer. A Pap test is a lab test in which cells are taken from your cervix and sent to the lab to look for signs of cervical cancer. If cancer of the cervix is found early, chances for a cure are good. Testing can generally end at age 65, 
or if a woman has a hysterectomy for a benign condition. Your provider may recommend more frequent testing if certain abnormal results are found.    Bone Mass Measurements:  · A painless x-ray of your bone density to see if you are at risk for a broken bone. Bone density refers to the thickness of bones or how tightly the bone tissue is packed.    Preventive Screening tests for Men    Prostate Screening:  · Should you have a prostate cancer test (PSA)?  It is up to you to decide if you want a prostate cancer test. Talk to your clinician to find out if the test is right for you.  Things for you to consider and talk about should include:  · Benefits and harms of the test  · Your family history  · How your race/ethnicity may influence the test  · If the test may impact other medical conditions you have  · Your values on screenings and treatments    *Medicare pays for many preventive services to keep you healthy. For some of these services, you might have to pay a deductible, coinsurance, and / or copayment.  The amounts vary depending on the type of services you need and the kind of Medicare health plan you have.              Medicare Wellness Visit  Plan for Preventive Care    A good way for you to stay healthy is to use preventive care.  Medicare covers many services that can help you stay healthy.* The goal of these services is to find any health problems as quickly as possible. Finding problems early can help make them easier to treat.  Your personal plan below lists the services you may need and when they are due.     Health Maintenance Summary     Hepatitis C Screening (Once)  Overdue since 10/14/2004    Lung Cancer Screening (Yearly)  Overdue since 10/14/2008    Abdominal Aortic Aneurysm (AAA) Screening (Once)  Overdue since 10/14/2018    Pneumococcal Vaccine 65+ (2 of 2 - PPSV23)  Order placed this encounter    Medicare Wellness Visit (Yearly)  Overdue since 12/12/2019    Shingles Vaccine (2 of 2)  Overdue 
since 4/27/2020    Influenza Vaccine (1)  Order placed this encounter    Depression Screening (Yearly)  Next due on 10/7/2021    DTaP/Tdap/Td Vaccine (3 - Td)  Next due on 7/12/2022    Colorectal Cancer Screening-Colonoscopy (Every 10 Years)  Next due on 3/10/2025    Meningococcal Vaccine   Aged Out    HPV Vaccine   Aged Out           Preventive Care for Women and Men    Heart Screenings (Cardiovascular):  · Blood tests are used to check your cholesterol, lipid and triglyceride levels. High levels can increase your risk for heart disease and stroke. High levels can be treated with medications, diet and exercise. Lowering your levels can help keep your heart and blood vessels healthy.  Your provider will order these tests if they are needed.    · An ultrasound is done to see if you have an abdominal aortic aneurysm (AAA).  This is an enlargement of one of the main blood vessels that delivers blood to the body.   In the [place], 9,000 deaths are caused by AAA.  You may not even know you have this problem and as many as 1 in 3 people will have a serious problem if it is not treated.  Early diagnosis allows for more effective treatment and cure.  If you have a family history of AAA or are a male age 65-75 who has smoked, you are at higher risk of an AAA.  Your provider can order this test, if needed.    Colorectal Screening:  · There are many tests that are used to check for cancer of your colon and rectum. You and your provider should discuss what test is best for you and when to have it done.  Options include:  · Screening Colonoscopy: exam of the entire colon, seen through a flexible lighted tube.  · Flexible Sigmoidoscopy: exam of the last third (sigmoid portion) of the colon and rectum, seen through a flexible lighted tube.  · Cologuard DNA stool test: a sample of your stool is used to screen for cancer and unseen blood in your stool.  · Fecal Occult Blood Test: a sample of your stool is studied to find any 
unseen blood    Flu Shot:  · An immunization that helps to prevent influenza (the flu). You should get this every year. The best time to get the shot is in the fall.    Pneumococcal Shot:  • Vaccines are available that can help prevent pneumococcal disease, which is any type of infection caused by Streptococcus pneumoniae bacteria.   Their use can prevent some cases of pneumonia, meningitis, and sepsis. There are two types of pneumococcal vaccines:   o Conjugate vaccines (PCV-13 or Prevnar 13®) - helps protect against the 13 types of pneumococcal bacteria that are the most common causes of serious infections in children and adults.    o Polysaccharide vaccine (PPSV23 or Mxqkrkmbj60®) - helps protect against 23 types of pneumococcal bacteria for patients who are recommended to get it.  These vaccines should be given at least 12 months apart.  A booster is usually not needed.     Hepatitis B Shot:  · An immunization that helps to protect people from getting Hepatitis B. Hepatitis B is a virus that spreads through contact with infected blood or body fluids. Many people with the virus do not have symptoms.  The virus can lead to serious problems, such as liver disease. Some people are at higher risk than others. Your doctor will tell you if you need this shot.     Diabetes Screening:  · A test to measure sugar (glucose) in your blood is called a fasting blood sugar. Fasting means you cannot have food or drink for at least 8 hours before the test. This test can detect diabetes long before you may notice symptoms.    Glaucoma Screening:  · Glaucoma screening is performed by your eye doctor. The test measures the fluid pressure inside your eyes to determine if you have glaucoma.     Hepatitis C Screening:  · A blood test to see if you have the hepatitis C virus.  Hepatitis C attacks the liver and is a major cause of chronic liver disease.  Medicare will cover a single screening for all adults born between 1945 & 1965, or 
high risk patients (people who have injected illegal drugs or people who have had blood transfusions).  High risk patients who continue to inject illegal drugs can be screened for Hepatitis C every year.    Smoking and Tobacco-Use Cessation Counseling:  · Tobacco is the single greatest cause of disease and early death in our country today. Medication and counseling together can increase a person’s chance of quitting for good.   · Medicare covers two quitting attempts per year, with four counseling sessions per attempt (eight sessions in a 12 month period)    Preventive Screening tests for Women    Screening Mammograms and Breast Exams:  · An x-ray of your breasts to check for breast cancer before you or your doctor may be able to feel it.  If breast cancer is found early it can usually be treated with success.    Pelvic Exams and Pap Tests:  · An exam to check for cervical and vaginal cancer. A Pap test is a lab test in which cells are taken from your cervix and sent to the lab to look for signs of cervical cancer. If cancer of the cervix is found early, chances for a cure are good. Testing can generally end at age 65, or if a woman has a hysterectomy for a benign condition. Your provider may recommend more frequent testing if certain abnormal results are found.    Bone Mass Measurements:  · A painless x-ray of your bone density to see if you are at risk for a broken bone. Bone density refers to the thickness of bones or how tightly the bone tissue is packed.    Preventive Screening tests for Men    Prostate Screening:  · Should you have a prostate cancer test (PSA)?  It is up to you to decide if you want a prostate cancer test. Talk to your clinician to find out if the test is right for you.  Things for you to consider and talk about should include:  · Benefits and harms of the test  · Your family history  · How your race/ethnicity may influence the test  · If the test may impact other medical conditions you 
have  · Your values on screenings and treatments    *Medicare pays for many preventive services to keep you healthy. For some of these services, you might have to pay a deductible, coinsurance, and / or copayment.  The amounts vary depending on the type of services you need and the kind of Medicare health plan you have.

## 2021-01-01 NOTE — ED CDU PROVIDER INITIAL DAY NOTE - RESPIRATORY NEGATIVE STATEMENT, MLM
no chest pain, no cough, and no shortness of breath.
[email or web address]

## 2021-04-04 NOTE — ED PROVIDER NOTE - EYES, MLM
Body mass index is 47 65 kg/m²    · Encourage lifestyle changes  · Consult nutrition Clear bilaterally, pupils equal, round and reactive to light. Jaundiced Sclera

## 2021-10-01 NOTE — ED CDU PROVIDER INITIAL DAY NOTE - NS_EDPROVIDERDISPOUSERTYPE_ED_A_ED
Marybel Francisco calling to follow up on form she is stating were faxed to [facility] on 9/27.     She can be reached at [phone].    Attending Attestation (For Attendings USE Only)...

## 2022-10-02 NOTE — H&P ADULT - ASSESSMENT
59 yo F visiting from [place] no PMHx p/w RUQ abdominal pain  found to have transaminitis, with numerous bilobar liver lesions seen on MRI, suspicious for diffuse tumor
Never smoker

## 2023-04-07 NOTE — PROVIDER CONTACT NOTE (CRITICAL VALUE NOTIFICATION) - NS PROVIDER READ BACK TO LAB
Anesthesia Start and Stop Event Times     Date Time Event    4/6/2023 1754 Ready for Procedure     1801 Anesthesia Start     2017 Anesthesia Stop        Responsible Staff  04/06/23    Name Role Begin End    Blake Jenkins M.D. Anesth 1801 2017        Overtime Reason:  no overtime (within assigned shift)    Comments:                                                       yes

## 2024-07-23 NOTE — PATIENT PROFILE ADULT. - AS SC BRADEN MOBILITY
[Feeling Poorly] : feeling poorly [Feeling Tired] : feeling tired [Anxiety] : anxiety [Depression] : depression [Emotional Problems] : emotional problems [As Noted in HPI] : as noted in HPI [Confused or Disoriented] : confusion [Memory Lapses or Loss] : memory loss [Decr. Concentrating Ability] : decreased concentrating ability [Changed Thought Patterns] : changed thought patterns [Repeating Questions] : repeated questioning about recent events [Difficulty Writing] : difficulty writing [Abnormal Sensation] : an abnormal sensation [Hypersensitivity] : hypersensitivity [Arthralgias] : arthralgias [Negative] : Heme/Lymph (2) very limited

## 2025-03-13 NOTE — ED PROVIDER NOTE - SKIN TURGOR
"Pt c/o \"sharp low abd pain on/off for past 2 days.  No n/v/d.  No fevers.  Pain with position changes.  Pt has hx of GI bleed.  Denies blood in stool.  No urinary issues.     Triage Assessment (Adult)       Row Name 03/13/25 1238          Triage Assessment    Airway WDL WDL        Respiratory WDL    Respiratory WDL WDL        Cognitive/Neuro/Behavioral WDL    Cognitive/Neuro/Behavioral WDL WDL                     " resilient/elastic